# Patient Record
Sex: FEMALE | Race: WHITE | NOT HISPANIC OR LATINO | URBAN - METROPOLITAN AREA
[De-identification: names, ages, dates, MRNs, and addresses within clinical notes are randomized per-mention and may not be internally consistent; named-entity substitution may affect disease eponyms.]

---

## 2017-05-20 ENCOUNTER — OUTPATIENT (OUTPATIENT)
Dept: OUTPATIENT SERVICES | Facility: HOSPITAL | Age: 82
LOS: 1 days | Discharge: HOME | End: 2017-05-20
Payer: MEDICARE

## 2017-05-20 DIAGNOSIS — L03.119 CELLULITIS OF UNSPECIFIED PART OF LIMB: ICD-10-CM

## 2017-05-20 DIAGNOSIS — D75.89 OTHER SPECIFIED DISEASES OF BLOOD AND BLOOD-FORMING ORGANS: ICD-10-CM

## 2017-05-20 DIAGNOSIS — M06.9 RHEUMATOID ARTHRITIS, UNSPECIFIED: ICD-10-CM

## 2017-05-20 DIAGNOSIS — L97.509 NON-PRESSURE CHRONIC ULCER OF OTHER PART OF UNSPECIFIED FOOT WITH UNSPECIFIED SEVERITY: ICD-10-CM

## 2017-05-20 DIAGNOSIS — M10.00 IDIOPATHIC GOUT, UNSPECIFIED SITE: ICD-10-CM

## 2017-05-20 PROCEDURE — 93971 EXTREMITY STUDY: CPT | Mod: 26,RT

## 2017-05-25 DIAGNOSIS — Z00.00 ENCOUNTER FOR GENERAL ADULT MEDICAL EXAMINATION WITHOUT ABNORMAL FINDINGS: ICD-10-CM

## 2017-06-11 ENCOUNTER — RESULT CHARGE (OUTPATIENT)
Age: 82
End: 2017-06-11

## 2017-06-12 ENCOUNTER — CLINICAL ADVICE (OUTPATIENT)
Age: 82
End: 2017-06-12

## 2017-06-12 ENCOUNTER — APPOINTMENT (OUTPATIENT)
Dept: OBGYN | Facility: CLINIC | Age: 82
End: 2017-06-12

## 2017-06-12 VITALS
HEIGHT: 65 IN | SYSTOLIC BLOOD PRESSURE: 118 MMHG | BODY MASS INDEX: 23.32 KG/M2 | WEIGHT: 140 LBS | HEART RATE: 74 BPM | TEMPERATURE: 97.8 F | RESPIRATION RATE: 16 BRPM | OXYGEN SATURATION: 98 % | DIASTOLIC BLOOD PRESSURE: 58 MMHG

## 2017-06-12 DIAGNOSIS — N95.2 POSTMENOPAUSAL ATROPHIC VAGINITIS: ICD-10-CM

## 2017-06-12 DIAGNOSIS — R82.90 UNSPECIFIED ABNORMAL FINDINGS IN URINE: ICD-10-CM

## 2017-06-12 LAB
BILIRUB UR QL STRIP: NORMAL
GLUCOSE UR-MCNC: NORMAL
HCG UR QL: 0.2 EU/DL
HGB UR QL STRIP.AUTO: NORMAL
KETONES UR-MCNC: NORMAL
LEUKOCYTE ESTERASE UR QL STRIP: NORMAL
NITRITE UR QL STRIP: POSITIVE
PH UR STRIP: 6.5
PROT UR STRIP-MCNC: 30
SP GR UR STRIP: 1.02

## 2017-08-24 ENCOUNTER — EMERGENCY (EMERGENCY)
Facility: HOSPITAL | Age: 82
LOS: 0 days | Discharge: HOME | End: 2017-08-24
Attending: EMERGENCY MEDICINE | Admitting: EMERGENCY MEDICINE
Payer: MEDICARE

## 2017-08-24 VITALS — WEIGHT: 139.99 LBS | HEIGHT: 65 IN

## 2017-08-24 VITALS
OXYGEN SATURATION: 100 % | DIASTOLIC BLOOD PRESSURE: 54 MMHG | SYSTOLIC BLOOD PRESSURE: 134 MMHG | RESPIRATION RATE: 15 BRPM | TEMPERATURE: 99 F | HEART RATE: 81 BPM

## 2017-08-24 DIAGNOSIS — M79.89 OTHER SPECIFIED SOFT TISSUE DISORDERS: ICD-10-CM

## 2017-08-24 DIAGNOSIS — I25.10 ATHEROSCLEROTIC HEART DISEASE OF NATIVE CORONARY ARTERY WITHOUT ANGINA PECTORIS: ICD-10-CM

## 2017-08-24 DIAGNOSIS — L03.116 CELLULITIS OF LEFT LOWER LIMB: ICD-10-CM

## 2017-08-24 DIAGNOSIS — I10 ESSENTIAL (PRIMARY) HYPERTENSION: ICD-10-CM

## 2017-08-24 DIAGNOSIS — L03.115 CELLULITIS OF RIGHT LOWER LIMB: ICD-10-CM

## 2017-08-24 DIAGNOSIS — E78.5 HYPERLIPIDEMIA, UNSPECIFIED: ICD-10-CM

## 2017-08-24 LAB
ALBUMIN SERPL ELPH-MCNC: 3.1 G/DL — LOW (ref 3.3–5)
ALP SERPL-CCNC: 87 U/L — SIGNIFICANT CHANGE UP (ref 40–120)
ALT FLD-CCNC: 58 U/L — SIGNIFICANT CHANGE UP (ref 12–78)
ANION GAP SERPL CALC-SCNC: 8 MMOL/L — SIGNIFICANT CHANGE UP (ref 5–17)
AST SERPL-CCNC: 54 U/L — HIGH (ref 15–37)
BASOPHILS # BLD AUTO: 0.1 K/UL — SIGNIFICANT CHANGE UP (ref 0–0.2)
BASOPHILS NFR BLD AUTO: 0.7 % — SIGNIFICANT CHANGE UP (ref 0–2)
BILIRUB SERPL-MCNC: 0.7 MG/DL — SIGNIFICANT CHANGE UP (ref 0.2–1.2)
BUN SERPL-MCNC: 27 MG/DL — HIGH (ref 7–23)
CALCIUM SERPL-MCNC: 9.4 MG/DL — SIGNIFICANT CHANGE UP (ref 8.5–10.1)
CHLORIDE SERPL-SCNC: 103 MMOL/L — SIGNIFICANT CHANGE UP (ref 96–108)
CO2 SERPL-SCNC: 27 MMOL/L — SIGNIFICANT CHANGE UP (ref 22–31)
CREAT SERPL-MCNC: 0.58 MG/DL — SIGNIFICANT CHANGE UP (ref 0.5–1.3)
EOSINOPHIL # BLD AUTO: 0 K/UL — SIGNIFICANT CHANGE UP (ref 0–0.5)
EOSINOPHIL NFR BLD AUTO: 0.1 % — SIGNIFICANT CHANGE UP (ref 0–6)
GLUCOSE SERPL-MCNC: 111 MG/DL — HIGH (ref 70–99)
HCT VFR BLD CALC: 35.1 % — SIGNIFICANT CHANGE UP (ref 34.5–45)
HGB BLD-MCNC: 11.6 G/DL — SIGNIFICANT CHANGE UP (ref 11.5–15.5)
LACTATE SERPL-SCNC: 1.9 MMOL/L — SIGNIFICANT CHANGE UP (ref 0.7–2)
LYMPHOCYTES # BLD AUTO: 0.5 K/UL — LOW (ref 1–3.3)
LYMPHOCYTES # BLD AUTO: 4.6 % — LOW (ref 13–44)
MCHC RBC-ENTMCNC: 31.2 PG — SIGNIFICANT CHANGE UP (ref 27–34)
MCHC RBC-ENTMCNC: 33.1 GM/DL — SIGNIFICANT CHANGE UP (ref 32–36)
MCV RBC AUTO: 94.2 FL — SIGNIFICANT CHANGE UP (ref 80–100)
MONOCYTES # BLD AUTO: 0.7 K/UL — SIGNIFICANT CHANGE UP (ref 0–0.9)
MONOCYTES NFR BLD AUTO: 6 % — SIGNIFICANT CHANGE UP (ref 2–14)
NEUTROPHILS # BLD AUTO: 10.1 K/UL — HIGH (ref 1.8–7.4)
NEUTROPHILS NFR BLD AUTO: 88.5 % — HIGH (ref 43–77)
PLATELET # BLD AUTO: 173 K/UL — SIGNIFICANT CHANGE UP (ref 150–400)
POTASSIUM SERPL-MCNC: 3.7 MMOL/L — SIGNIFICANT CHANGE UP (ref 3.5–5.3)
POTASSIUM SERPL-SCNC: 3.7 MMOL/L — SIGNIFICANT CHANGE UP (ref 3.5–5.3)
PROT SERPL-MCNC: 6.9 GM/DL — SIGNIFICANT CHANGE UP (ref 6–8.3)
RBC # BLD: 3.73 M/UL — LOW (ref 3.8–5.2)
RBC # FLD: 13.3 % — SIGNIFICANT CHANGE UP (ref 10.3–14.5)
SODIUM SERPL-SCNC: 138 MMOL/L — SIGNIFICANT CHANGE UP (ref 135–145)
WBC # BLD: 11.4 K/UL — HIGH (ref 3.8–10.5)
WBC # FLD AUTO: 11.4 K/UL — HIGH (ref 3.8–10.5)

## 2017-08-24 PROCEDURE — 99285 EMERGENCY DEPT VISIT HI MDM: CPT

## 2017-08-24 PROCEDURE — 93010 ELECTROCARDIOGRAM REPORT: CPT

## 2017-08-24 RX ORDER — VANCOMYCIN HCL 1 G
1000 VIAL (EA) INTRAVENOUS ONCE
Qty: 0 | Refills: 0 | Status: COMPLETED | OUTPATIENT
Start: 2017-08-24 | End: 2017-08-24

## 2017-08-24 RX ADMIN — Medication 250 MILLIGRAM(S): at 17:01

## 2017-08-24 NOTE — ED ADULT NURSE NOTE - CHIEF COMPLAINT QUOTE
Patient was sent by Dr. Roberts who she was referred to by Dr. Rojo for evaluation of edematous painful right lower extremity with warmth and erythema.

## 2017-08-24 NOTE — ED STATDOCS - CONSTITUTIONAL, MLM
normal... well appearing, well nourished, and in no apparent distress. well appearing, thin and frail elderly female in no apparent distress.

## 2017-08-24 NOTE — ED STATDOCS - PSH
Cochlear implant in place  left ear  Glaucoma  s/p trabeculectomy  S/P carpal tunnel release    S/P cataract surgery, left    S/P D&C (status post dilation and curettage)

## 2017-08-24 NOTE — ED STATDOCS - SKIN, MLM
skin normal color for race, warm, dry and RLE with erythema and edema. skin normal color for race, warm, dry and RLE with erythema and edema to the mid lower leg extending through the knee and streaking up the posterior leg.

## 2017-08-24 NOTE — ED STATDOCS - ATTENDING CONTRIBUTION TO CARE
I, Ena Aranda MD, personally saw the patient with the resident, and completed the key components of the history and physical exam. I then discussed the management plan with the resident.

## 2017-08-24 NOTE — ED STATDOCS - PMH
Aortic insufficiency    CAD (coronary artery disease)    Cholelithiases    H/O hypercholesterolemia    HTN (hypertension)    Malignant melanoma    Uterine prolapse

## 2017-08-24 NOTE — ED STATDOCS - OBJECTIVE STATEMENT
89 y/o female w/ hx of melanoma, PVD, HTN, HLD presents to the ED for leg edema with onset in the past few days worse in the past few days. Patient sent in from Dr. Roberts's office for evaluation. Patient had onset of sxs about 4 days ago used elevation of leg with ice that minimally improved sxs. Redness is worsening since 4 days and Health aide states ice relieves erythema. Since 8 AM this morning edema has been worsening today as per aide. Patient had melanoma surgery in March 2017. Had a RLE ulcer to the bottom of the foot. No chest pain, shortness of breath, abd pain, N/V/D. 89 y/o female w/ hx of melanoma, PVD, HTN, HLD presents to the ED for leg edema with onset in the past few days worse in the past few hours. Patient sent in from Dr. Roberts's office for evaluation. Patient had onset of sxs about 4 days ago used elevation of leg with ice that minimally improved sxs. Redness is worsening since 4 days and Health aide states ice relieves erythema. Since 8 AM this morning edema has been worsening as per aide. Patient had melanoma surgery in March 2017 to the posterior medial RLE. Had a RLE ulcer to the bottom of the foot as well under treatment with Podiatry. No chest pain, shortness of breath, abd pain, N/V/D.

## 2017-08-24 NOTE — ED STATDOCS - PROGRESS NOTE DETAILS
Labwork resulted, lactate normal,  wbc mildly elevated, blood cultures pending, will send pt home with oral abx, as allergic to bactrim, will send on clindamycin. patient also has 24 hour aid at home. Lab work resulted, lactate normal,  wbc mildly elevated, blood cultures pending, will send pt home with oral abx, as allergic to bactrim, will send on clindamycin. patient also has 24 hour aid at home.

## 2017-08-25 LAB
-  CANDIDA ALBICANS: SIGNIFICANT CHANGE UP
-  CANDIDA GLABRATA: SIGNIFICANT CHANGE UP
-  CANDIDA KRUSEI: SIGNIFICANT CHANGE UP
-  CANDIDA PARAPSILOSIS: SIGNIFICANT CHANGE UP
-  CANDIDA TROPICALIS: SIGNIFICANT CHANGE UP
-  COAGULASE NEGATIVE STAPHYLOCOCCUS: SIGNIFICANT CHANGE UP
-  K. PNEUMONIAE GROUP: SIGNIFICANT CHANGE UP
-  KPC RESISTANCE GENE: SIGNIFICANT CHANGE UP
-  STREPTOCOCCUS SP. (NOT GRP A, B OR S PNEUMONIAE): SIGNIFICANT CHANGE UP
A BAUMANNII DNA SPEC QL NAA+PROBE: SIGNIFICANT CHANGE UP
E CLOAC COMP DNA BLD POS QL NAA+PROBE: SIGNIFICANT CHANGE UP
E COLI DNA BLD POS QL NAA+NON-PROBE: SIGNIFICANT CHANGE UP
ENTEROCOC DNA BLD POS QL NAA+NON-PROBE: SIGNIFICANT CHANGE UP
ENTEROCOC DNA BLD POS QL NAA+NON-PROBE: SIGNIFICANT CHANGE UP
GP B STREP DNA BLD POS QL NAA+NON-PROBE: SIGNIFICANT CHANGE UP
GRAM STN FLD: SIGNIFICANT CHANGE UP
HAEM INFLU DNA BLD POS QL NAA+NON-PROBE: SIGNIFICANT CHANGE UP
K OXYTOCA DNA BLD POS QL NAA+NON-PROBE: SIGNIFICANT CHANGE UP
L MONOCYTOG DNA BLD POS QL NAA+NON-PROBE: SIGNIFICANT CHANGE UP
METHOD TYPE: SIGNIFICANT CHANGE UP
MRSA SPEC QL CULT: SIGNIFICANT CHANGE UP
MSSA DNA SPEC QL NAA+PROBE: SIGNIFICANT CHANGE UP
N MEN ISLT CULT: SIGNIFICANT CHANGE UP
P AERUGINOSA DNA BLD POS NAA+NON-PROBE: SIGNIFICANT CHANGE UP
PROTEUS SP DNA BLD POS QL NAA+NON-PROBE: SIGNIFICANT CHANGE UP
S MARCESCENS DNA BLD POS NAA+NON-PROBE: SIGNIFICANT CHANGE UP
S PNEUM DNA BLD POS QL NAA+NON-PROBE: SIGNIFICANT CHANGE UP
S PYO DNA BLD POS QL NAA+NON-PROBE: SIGNIFICANT CHANGE UP
SPECIMEN SOURCE: SIGNIFICANT CHANGE UP
SPECIMEN SOURCE: SIGNIFICANT CHANGE UP

## 2017-08-25 NOTE — ED POST DISCHARGE NOTE - OTHER COMMUNICATION
Contacted patient by phone and reported results of + blood culture. Instructed to return to ED for further evaluation and treatment. Patient agreed to return to ED. Car SOSA.

## 2017-08-27 LAB
-  CEFAZOLIN: SIGNIFICANT CHANGE UP
-  CLINDAMYCIN: SIGNIFICANT CHANGE UP
-  LEVOFLOXACIN: SIGNIFICANT CHANGE UP
-  PENICILLIN: SIGNIFICANT CHANGE UP
-  VANCOMYCIN: SIGNIFICANT CHANGE UP
CULTURE RESULTS: SIGNIFICANT CHANGE UP
CULTURE RESULTS: SIGNIFICANT CHANGE UP
METHOD TYPE: SIGNIFICANT CHANGE UP
ORGANISM # SPEC MICROSCOPIC CNT: SIGNIFICANT CHANGE UP
SPECIMEN SOURCE: SIGNIFICANT CHANGE UP
SPECIMEN SOURCE: SIGNIFICANT CHANGE UP

## 2017-08-28 ENCOUNTER — INPATIENT (INPATIENT)
Facility: HOSPITAL | Age: 82
LOS: 3 days | Discharge: NURSING, REHAB OR LONG-TERM CARE | DRG: 872 | End: 2017-09-01
Attending: HOSPITALIST | Admitting: HOSPITALIST
Payer: MEDICARE

## 2017-08-28 VITALS
DIASTOLIC BLOOD PRESSURE: 64 MMHG | SYSTOLIC BLOOD PRESSURE: 121 MMHG | TEMPERATURE: 98 F | HEIGHT: 65 IN | HEART RATE: 75 BPM | RESPIRATION RATE: 20 BRPM | OXYGEN SATURATION: 99 % | WEIGHT: 143.08 LBS

## 2017-08-28 DIAGNOSIS — R79.89 OTHER SPECIFIED ABNORMAL FINDINGS OF BLOOD CHEMISTRY: ICD-10-CM

## 2017-08-28 DIAGNOSIS — M25.552 PAIN IN LEFT HIP: ICD-10-CM

## 2017-08-28 DIAGNOSIS — L27.0 GENERALIZED SKIN ERUPTION DUE TO DRUGS AND MEDICAMENTS TAKEN INTERNALLY: ICD-10-CM

## 2017-08-28 DIAGNOSIS — R82.71 BACTERIURIA: ICD-10-CM

## 2017-08-28 LAB
ALBUMIN SERPL ELPH-MCNC: 3 G/DL — LOW (ref 3.3–5.2)
ALP SERPL-CCNC: 99 U/L — SIGNIFICANT CHANGE UP (ref 40–120)
ALT FLD-CCNC: 39 U/L — HIGH
ANION GAP SERPL CALC-SCNC: 13 MMOL/L — SIGNIFICANT CHANGE UP (ref 5–17)
AST SERPL-CCNC: 37 U/L — HIGH
BASOPHILS # BLD AUTO: 0 K/UL — SIGNIFICANT CHANGE UP (ref 0–0.2)
BASOPHILS NFR BLD AUTO: 0.3 % — SIGNIFICANT CHANGE UP (ref 0–2)
BILIRUB SERPL-MCNC: 0.5 MG/DL — SIGNIFICANT CHANGE UP (ref 0.4–2)
BUN SERPL-MCNC: 13 MG/DL — SIGNIFICANT CHANGE UP (ref 8–20)
CALCIUM SERPL-MCNC: 8.5 MG/DL — LOW (ref 8.6–10.2)
CHLORIDE SERPL-SCNC: 100 MMOL/L — SIGNIFICANT CHANGE UP (ref 98–107)
CO2 SERPL-SCNC: 26 MMOL/L — SIGNIFICANT CHANGE UP (ref 22–29)
CREAT SERPL-MCNC: 0.38 MG/DL — LOW (ref 0.5–1.3)
EOSINOPHIL # BLD AUTO: 0 K/UL — SIGNIFICANT CHANGE UP (ref 0–0.5)
EOSINOPHIL NFR BLD AUTO: 0.5 % — SIGNIFICANT CHANGE UP (ref 0–6)
GLUCOSE SERPL-MCNC: 105 MG/DL — SIGNIFICANT CHANGE UP (ref 70–115)
HCT VFR BLD CALC: 35.3 % — LOW (ref 37–47)
HGB BLD-MCNC: 11.7 G/DL — LOW (ref 12–16)
LACTATE BLDV-MCNC: 1.1 MMOL/L — SIGNIFICANT CHANGE UP (ref 0.5–2)
LYMPHOCYTES # BLD AUTO: 1 K/UL — SIGNIFICANT CHANGE UP (ref 1–4.8)
LYMPHOCYTES # BLD AUTO: 12.5 % — LOW (ref 20–55)
MCHC RBC-ENTMCNC: 30.6 PG — SIGNIFICANT CHANGE UP (ref 27–31)
MCHC RBC-ENTMCNC: 33.1 G/DL — SIGNIFICANT CHANGE UP (ref 32–36)
MCV RBC AUTO: 92.4 FL — SIGNIFICANT CHANGE UP (ref 81–99)
MONOCYTES # BLD AUTO: 0.7 K/UL — SIGNIFICANT CHANGE UP (ref 0–0.8)
MONOCYTES NFR BLD AUTO: 9.3 % — SIGNIFICANT CHANGE UP (ref 3–10)
NEUTROPHILS # BLD AUTO: 6 K/UL — SIGNIFICANT CHANGE UP (ref 1.8–8)
NEUTROPHILS NFR BLD AUTO: 76.5 % — HIGH (ref 37–73)
PLATELET # BLD AUTO: 253 K/UL — SIGNIFICANT CHANGE UP (ref 150–400)
POTASSIUM SERPL-MCNC: 4.2 MMOL/L — SIGNIFICANT CHANGE UP (ref 3.5–5.3)
POTASSIUM SERPL-SCNC: 4.2 MMOL/L — SIGNIFICANT CHANGE UP (ref 3.5–5.3)
PROT SERPL-MCNC: 6.4 G/DL — LOW (ref 6.6–8.7)
RBC # BLD: 3.82 M/UL — LOW (ref 4.4–5.2)
RBC # FLD: 14.6 % — SIGNIFICANT CHANGE UP (ref 11–15.6)
SODIUM SERPL-SCNC: 139 MMOL/L — SIGNIFICANT CHANGE UP (ref 135–145)
WBC # BLD: 7.9 K/UL — SIGNIFICANT CHANGE UP (ref 4.8–10.8)
WBC # FLD AUTO: 7.9 K/UL — SIGNIFICANT CHANGE UP (ref 4.8–10.8)

## 2017-08-28 PROCEDURE — 99223 1ST HOSP IP/OBS HIGH 75: CPT

## 2017-08-28 PROCEDURE — 99285 EMERGENCY DEPT VISIT HI MDM: CPT

## 2017-08-28 PROCEDURE — 76377 3D RENDER W/INTRP POSTPROCES: CPT | Mod: 26

## 2017-08-28 PROCEDURE — 72192 CT PELVIS W/O DYE: CPT | Mod: 26

## 2017-08-28 PROCEDURE — 73502 X-RAY EXAM HIP UNI 2-3 VIEWS: CPT | Mod: 26,LT

## 2017-08-28 RX ORDER — VANCOMYCIN HCL 1 G
1000 VIAL (EA) INTRAVENOUS ONCE
Qty: 0 | Refills: 0 | Status: COMPLETED | OUTPATIENT
Start: 2017-08-28 | End: 2017-08-28

## 2017-08-28 RX ORDER — VANCOMYCIN HCL 1 G
1000 VIAL (EA) INTRAVENOUS EVERY 24 HOURS
Qty: 0 | Refills: 0 | Status: DISCONTINUED | OUTPATIENT
Start: 2017-08-29 | End: 2017-08-29

## 2017-08-28 RX ORDER — PIPERACILLIN AND TAZOBACTAM 4; .5 G/20ML; G/20ML
3.38 INJECTION, POWDER, LYOPHILIZED, FOR SOLUTION INTRAVENOUS ONCE
Qty: 0 | Refills: 0 | Status: COMPLETED | OUTPATIENT
Start: 2017-08-28 | End: 2017-08-28

## 2017-08-28 RX ORDER — ENOXAPARIN SODIUM 100 MG/ML
40 INJECTION SUBCUTANEOUS DAILY
Qty: 0 | Refills: 0 | Status: DISCONTINUED | OUTPATIENT
Start: 2017-08-28 | End: 2017-09-01

## 2017-08-28 RX ORDER — ACETAMINOPHEN WITH CODEINE 300MG-30MG
1 TABLET ORAL ONCE
Qty: 0 | Refills: 0 | Status: DISCONTINUED | OUTPATIENT
Start: 2017-08-28 | End: 2017-08-28

## 2017-08-28 RX ORDER — ATORVASTATIN CALCIUM 80 MG/1
10 TABLET, FILM COATED ORAL AT BEDTIME
Qty: 0 | Refills: 0 | Status: DISCONTINUED | OUTPATIENT
Start: 2017-08-28 | End: 2017-09-01

## 2017-08-28 RX ORDER — DOCUSATE SODIUM 100 MG
100 CAPSULE ORAL
Qty: 0 | Refills: 0 | Status: DISCONTINUED | OUTPATIENT
Start: 2017-08-28 | End: 2017-09-01

## 2017-08-28 RX ORDER — OMEGA-3 ACID ETHYL ESTERS 1 G
2 CAPSULE ORAL
Qty: 0 | Refills: 0 | Status: DISCONTINUED | OUTPATIENT
Start: 2017-08-28 | End: 2017-09-01

## 2017-08-28 RX ORDER — OXYCODONE AND ACETAMINOPHEN 5; 325 MG/1; MG/1
1 TABLET ORAL EVERY 4 HOURS
Qty: 0 | Refills: 0 | Status: DISCONTINUED | OUTPATIENT
Start: 2017-08-28 | End: 2017-09-01

## 2017-08-28 RX ORDER — SENNA PLUS 8.6 MG/1
2 TABLET ORAL AT BEDTIME
Qty: 0 | Refills: 0 | Status: DISCONTINUED | OUTPATIENT
Start: 2017-08-28 | End: 2017-09-01

## 2017-08-28 RX ORDER — ASPIRIN/CALCIUM CARB/MAGNESIUM 324 MG
81 TABLET ORAL DAILY
Qty: 0 | Refills: 0 | Status: DISCONTINUED | OUTPATIENT
Start: 2017-08-28 | End: 2017-09-01

## 2017-08-28 RX ORDER — CHOLECALCIFEROL (VITAMIN D3) 125 MCG
1000 CAPSULE ORAL DAILY
Qty: 0 | Refills: 0 | Status: DISCONTINUED | OUTPATIENT
Start: 2017-08-28 | End: 2017-09-01

## 2017-08-28 RX ORDER — AMLODIPINE BESYLATE 2.5 MG/1
5 TABLET ORAL DAILY
Qty: 0 | Refills: 0 | Status: DISCONTINUED | OUTPATIENT
Start: 2017-08-28 | End: 2017-09-01

## 2017-08-28 RX ORDER — PYRIDOXINE HCL (VITAMIN B6) 100 MG
50 TABLET ORAL DAILY
Qty: 0 | Refills: 0 | Status: DISCONTINUED | OUTPATIENT
Start: 2017-08-28 | End: 2017-09-01

## 2017-08-28 RX ORDER — POLYETHYLENE GLYCOL 3350 17 G/17G
17 POWDER, FOR SOLUTION ORAL DAILY
Qty: 0 | Refills: 0 | Status: DISCONTINUED | OUTPATIENT
Start: 2017-08-28 | End: 2017-09-01

## 2017-08-28 RX ADMIN — PIPERACILLIN AND TAZOBACTAM 200 GRAM(S): 4; .5 INJECTION, POWDER, LYOPHILIZED, FOR SOLUTION INTRAVENOUS at 17:45

## 2017-08-28 RX ADMIN — Medication 1 TABLET(S): at 15:13

## 2017-08-28 RX ADMIN — OXYCODONE AND ACETAMINOPHEN 1 TABLET(S): 5; 325 TABLET ORAL at 23:00

## 2017-08-28 RX ADMIN — OXYCODONE AND ACETAMINOPHEN 1 TABLET(S): 5; 325 TABLET ORAL at 22:13

## 2017-08-28 RX ADMIN — Medication 250 MILLIGRAM(S): at 22:13

## 2017-08-28 RX ADMIN — Medication 1 TABLET(S): at 15:43

## 2017-08-28 NOTE — H&P ADULT - ASSESSMENT
Pt with multiple medical problems as mentioned above, was seen in [facility] 4 days ago and treated for cellulitis, discharged home on clindamycin and today was called back for positive blood culture showing gram positive cocci in pairs and chains. Pt has no complaints at this time. Pt with above mentioned medical problems as mentioned above, was seen in [facility] 4 days ago and treated for cellulitis, discharged home on clindamycin and today was called back for positive blood culture showing gram positive cocci in pairs and chains. Pt has no complaints at this time except constipation.   S/p Vanco and zosyn in ED, evaluated by ID and recommended keep patient on vancomycin and d/c clindamycin.    Positive blood culture:  - Group B strep, being treated for RLE cellulitis with clindamycin from [facility].  - No fever or leukocytosis  - Seen by ID, will start on vancomycin. Pt got one dose now, awaiting BMP result, will re-dose tomorrow based on renal function.  - Blood cultures ordered.  - F/u ID in am.    History of melanoma, PVD, HTN, HLD:  - needs more collateral info from PMD, I called his office but unreachable.  - Resume home medication as reconsilled with antihypertensive at lower dose for now. Pt with above mentioned medical problems as mentioned above, was seen in [facility] 4 days ago and treated for cellulitis, discharged home on clindamycin and today was called back for positive blood culture showing gram positive cocci in pairs and chains. Pt has no complaints at this time except constipation.   S/p Vanco and zosyn in ED, evaluated by ID and recommended keep patient on vancomycin and d/c clindamycin.    Bacteremia: Positive blood culture.  - Group B strep, being treated for RLE cellulitis with clindamycin from [facility].  - No fever or leukocytosis  - Seen by ID, will start on vancomycin. Stat dose ordered by ED physician, awaiting BMP result, will re-dose tomorrow based on renal function.  - Blood cultures ordered.  - F/u ID in am.    History of melanoma, PVD, HTN, HLD:  - needs more collateral info from PMD, I called his office but unreachable.  - Resume home medication as reconciled with antihypertensive at lower dose for now.

## 2017-08-28 NOTE — ED ADULT NURSE REASSESSMENT NOTE - NS ED NURSE REASSESS COMMENT FT1
Pt back from echo with zosyn not infusing.  IV catheter in left AC checked, repositioned and patent.  Zosyn continued.  Pt c/o left hip pain.  will medicate as per MD order.  Awaiting transport to [address] [place] [address].
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
## 2017-08-28 NOTE — H&P ADULT - HISTORY OF PRESENT ILLNESS
89 y/o female w/ hx of melanoma, PVD, HTN, HLD, seen in ED with HHA at bedside. History is very limited, obtained from patient HHA and medical records. She was recently seen in [facility] on 8/24 and found to have RLE cellulitis, blood cx were drawn and patient was discharged from ED on oral clindamycin. Pt was called back from [facility] for positive blood culture and she was told to come back to ED. Per patient and HHA her RLE cellulitis is much better with clindamycin. She reported constipation otherwise denied any active complaints, no fever, chills, nausea, vomiting, headache, dizziness, chest pain, SOB or palpitation.  In ED patient got vancomycin and zosyn and blood work ordered including repeat blood culture. ID was also consulted.

## 2017-08-28 NOTE — ED ADULT NURSE NOTE - CHPI ED SYMPTOMS NEG
no deformity/no tingling/no abrasion/no numbness/no back pain/no difficulty bearing weight/no bruising/no fever/no stiffness/no weakness

## 2017-08-28 NOTE — ED ADULT TRIAGE NOTE - CHIEF COMPLAINT QUOTE
Patient BIBA to ED today with c/o left hip pain.  Patient denies recent injury.  Patient is with home health aide.

## 2017-08-28 NOTE — ED PROVIDER NOTE - CARE PLAN
Principal Discharge DX:	Blood culture positive for microorganism  Secondary Diagnosis:	Pain of left hip joint

## 2017-08-28 NOTE — ED PROVIDER NOTE - SKIN, MLM
Skin normal color for race, warm, dry and intact. Hyperpigmentation noted to bilateral lower extremity right greater than left

## 2017-08-28 NOTE — CONSULT NOTE ADULT - PROBLEM SELECTOR RECOMMENDATION 3
Reports rash to cephalosporin   Tolerated a dose of Zosyn in the ED.   Will discuss options with pharmacist tomorrow

## 2017-08-28 NOTE — H&P ADULT - NSHPPHYSICALEXAM_GEN_ALL_CORE
PHYSICAL EXAM:    Vital Signs Last 24 Hrs  T(C): 36.9 (28 Aug 2017 12:46), Max: 36.9 (28 Aug 2017 12:46)  T(F): 98.5 (28 Aug 2017 12:46), Max: 98.5 (28 Aug 2017 12:46)  HR: 75 (28 Aug 2017 12:46) (75 - 75)  BP: 121/64 (28 Aug 2017 12:46) (121/64 - 121/64)  BP(mean): --  RR: 20 (28 Aug 2017 12:46) (20 - 20)  SpO2: 99% (28 Aug 2017 12:46) (99% - 99%)    GENERAL: Pt lying comfortably, NAD.  ENMT: PERRL, +EOMI.  NECK: soft, Supple, No JVD,   CHEST/LUNG: Clear to auscultation bilaterally; No wheezing.  HEART: S1S2+, Regular rate and rhythm; No murmurs.  ABDOMEN: Soft, Nontender, Nondistended; Bowel sounds present.  MUSCULOSKELETAL: Range of motion limited.  SKIN: B/l LE hyperpigmentation.  NEURO: AAOX3, no focal deficits, no motor r sensory loss.  PSYCH: normal mood. PHYSICAL EXAM:    Vital Signs Last 24 Hrs  T(C): 36.9 (28 Aug 2017 12:46), Max: 36.9 (28 Aug 2017 12:46)  T(F): 98.5 (28 Aug 2017 12:46), Max: 98.5 (28 Aug 2017 12:46)  HR: 75 (28 Aug 2017 12:46) (75 - 75)  BP: 121/64 (28 Aug 2017 12:46) (121/64 - 121/64)  BP(mean): --  RR: 20 (28 Aug 2017 12:46) (20 - 20)  SpO2: 99% (28 Aug 2017 12:46) (99% - 99%)    GENERAL: Pt lying comfortably, NAD, hard of hearing.  ENMT: PERRL, +EOMI.  NECK: soft, Supple, No JVD,   CHEST/LUNG: Clear to auscultation bilaterally; No wheezing.  HEART: S1S2+, Regular rate and rhythm; No murmurs.  ABDOMEN: Soft, Nontender, Nondistended; Bowel sounds present.  MUSCULOSKELETAL: Range of motion limited.  SKIN: RLE redness, per HHA much better now. Very fragile skin with bruises.  NEURO: AAOX3, no focal deficits, no motor r sensory loss.  PSYCH: normal mood.

## 2017-08-28 NOTE — CONSULT NOTE ADULT - SUBJECTIVE AND OBJECTIVE BOX
[facility]  INFECTIOUS DISEASES AND INTERNAL MEDICINE OF [place]  =======================================================  Krunal Delacruz MD  Diplomates American Board of Internal Medicine and Infectious Diseases  =======================================================      [id number]  MAYRA BROWN    CC: Patient is a 89y old  Female who presents with a chief complaint of Called for positive blood culture. (28 Aug 2017 16:49)    89y/o  Female with h/o melanoma, PVD, HTN, HLD. Patient was seen by her vascular surgeon last week Tuesday 8/22 and was having an Unna boot placed. Patient laser that days felt chills which continued on through Thursday at which time her LLE swelled up. She called the surgeon and was brought to be seen in the office and was referred to [facility] from the office on 8/24 for IV antibiotics for a cellulitis. Patient was discharged from tat hospital visit on Clindamycin 300mg PO q6 hours. Patient improved. She was called at home today to come into the ED due to positive blood cultures. Patient reports a cephalosporin allergy but was given Vancomycin and Zosyn in the ER. ID input requested.       Past Medical & Surgical Hx:  Cholelithiases  Malignant melanoma  Aortic insufficiency  CAD (coronary artery disease)  Uterine prolapse  H/O hypercholesterolemia  HTN (hypertension)  Glaucoma: s/p trabeculectomy  Cochlear implant in place: left ear  S/P carpal tunnel release  S/P D&C (status post dilation and curettage)  S/P cataract surgery, left      Social Hx:  Denies smoking, ETPH or drug use      FAMILY HISTORY:  No pertinent family history in first degree relatives      Allergies  Bactrim (Unknown)  Neosporin (Rash)      Antibiotics:  Vancomycin IV       REVIEW OF SYSTEMS:  CONSTITUTIONAL:  No Fever or chills  HEENT:  No diplopia or blurred vision.  No earache, sore throat or runny nose.  CARDIOVASCULAR:  No pressure, squeezing, strangling, tightness, heaviness or aching about the chest, neck, axilla or epigastrium.  RESPIRATORY:  No cough, shortness of breath  GASTROINTESTINAL:  No nausea, vomiting or diarrhea.  GENITOURINARY:  No dysuria, frequency or urgency  MUSCULOSKELETAL:  no joint aches, no muscle pain  SKIN:  No change in skin, hair or nails.  NEUROLOGIC:  No paresthesias, fasciculations  PSYCHIATRIC:  No disorder of thought or mood.  ENDOCRINE:  No heat or cold intolerance  HEMATOLOGICAL:  No easy bruising or bleeding.       Physical Exam:  Vital Signs Last 24 Hrs  T(C): 36.9 (28 Aug 2017 12:46), Max: 36.9 (28 Aug 2017 12:46)  T(F): 98.5 (28 Aug 2017 12:46), Max: 98.5 (28 Aug 2017 12:46)  HR: 78 (28 Aug 2017 22:05) (75 - 78)  BP: 121/64 (28 Aug 2017 12:46) (121/64 - 121/64)  RR: 18 (28 Aug 2017 22:05) (18 - 20)  SpO2: 99% (28 Aug 2017 12:46) (99% - 99%)  Height (cm): 165.1 (08-28 @ 12:46)  Weight (kg): 64.9 (08-28 @ 12:46)  BMI (kg/m2): 23.8 (08-28 @ 12:46)  BSA (m2): 1.72 (08-28 @ 12:46)      GEN: NAD, pleasant  HEENT: normocephalic and atraumatic. EOMI. PERRL.  +hearing loss, chronic  NECK: Supple  LUNGS: Clear to auscultation.  HEART: Regular rate and rhythm   ABDOMEN: Soft, nontender, and nondistended.  Positive bowel sounds.    : No CVA tenderness  EXTREMITIES: LLE with healed ulcer, no erythema. B/L [place] toes.  MSK: No joint swelling  NEUROLOGIC: no focal deficits  PSYCHIATRIC: Appropriate affect .  SKIN: No rash        Labs:  08-28    139  |  100  |  13.0  ----------------------------<  105  4.2   |  26.0  |  0.38<L>    Ca    8.5<L>      28 Aug 2017 18:17    TPro  6.4<L>  /  Alb  3.0<L>  /  TBili  0.5  /  DBili  x   /  AST  37<H>  /  ALT  39<H>  /  AlkPhos  99  08-28                          11.7   7.9   )-----------( 253      ( 28 Aug 2017 18:17 )             35.3       LIVER FUNCTIONS - ( 28 Aug 2017 18:17 )  Alb: 3.0 g/dL / Pro: 6.4 g/dL / ALK PHOS: 99 U/L / ALT: 39 U/L / AST: 37 U/L / GGT: x             RECENT CULTURES:  Culture - Blood (08.24.17 @ 16:22)    Gram Stain:   Growth in aerobic and anaerobic bottles: Gram Positive Cocci in Pairs and  Chains    Specimen Source: .Blood None    Culture Results:   Growth in aerobic and anaerobic bottles: Streptococcus agalactiae (Group  B)      Culture - Blood (08.24.17 @ 16:03)    -  Levofloxacin: S [id number]    -  Cefazolin: S    -  Vancomycin: S [id number]    -  Penicillin: S    -  Streptococcus agalactiae (Group B): Detec    Gram Stain:   Growth in aerobic and anaerobic bottles: Gram Positive Cocci in Pairs and  Chains    -  Clindamycin: S    Specimen Source: .Blood None    Organism: Blood Culture PCR    Organism: Streptococcus agalactiae (Group B)    Culture Results:   Growth in aerobic and anaerobic bottles: Streptococcus agalactiae (Group  B)  ***Blood Panel PCR results on this specimen are available  approximately 3 hours after the Gram stain result.***  Gram stain, PCR, and/or culture results may not always  correspond due to difference in methodologies.    Organism Identification: Blood Culture PCR  Streptococcus agalactiae (Group B)    Method Type: KB    Method Type: PCR        EXAM:  CT PELVIS BONY ONLY                        EXAM:  CT 3D RECONSTRUCT W DOMONIQUE                        PROCEDURE DATE:  08/28/2017    INTERPRETATION:  CT PELVIS BONY ONLY, CT 3D RECONSTRUCT W DOMONIQUE dated   8/28/2017 5:05 PM   INDICATION: Left hip pain  COMPARISON: Pelvic radiographs of the same date  TECHNIQUE: CT imaging of the pelvis was performed. The data was   reformatted in the axial, coronal, and sagittal planes. Additionally, 3-D   reformatted imaging was created at a separate workstation.  FINDINGS:   Evaluation of the osseous structures demonstrates mildly decreased bone   mineralization which limits detailed evaluation. Given this limitation,   no displaced fracture is identified. There is spurring and productive   change about both acetabulum. There is moderate spurring at the pubic   symphysis. A moderate levocurvature of the lower lumbar spine with severe   multilevel spondylosis is noted. The sacroiliac joints appear intact.  Evaluation of the soft tissues demonstrates a symmetric appearance of the   muscle bulk. No intramuscular hematoma is identified. The proximal   hamstring tendons demonstrate moderate enthesophyte changes bilaterally.   The iliopsoas tendon is intact bilaterally. There is spurring and   productive change of both abductor tendon insertions.  There is a mild to moderate amount of pelvic ascites. There is   cholelithiasis. There are multiple nonenlarged mesenteric lymph nodes   within the right abdomen. There is a 1.1 cm right periportal lymph node.   There is a 1.8 cm hypodense left inguinal lymph node. There is a mid   pelvic mesenteric mass that measures approximately 2.1 x 2.8 x 2.3 cm.   There is a heterogeneous appearance of the uterus. There are   calcifications which are nonobstructing at the lower visualized portions   of the kidneys bilaterally. There is a small fat-containing umbilical   hernia. There are scattered colonic diverticula.  There is moderate soft tissue swelling about the pelvis. No soft tissue   mass is seen. Moderate amount of vascular calcification within the   infrarenal abdominal aorta and its major branches.  3-D reformatted imaging confirms these findings.  IMPRESSION:   1.  Limited by decreased bone mineralization. No displaced fracture.  2.  Mass in the mid pelvic mesentery suspicious for neoplasm. Mild to   moderate abdominal ascites with lymphadenopathy as described above.   Heterogeneous appearance of the uterus. Dedicated CT the abdomen and   pelvis can be performed for further evaluation.  3.  Degenerative changes as above.  4.  Cholelithiasis.

## 2017-08-28 NOTE — ED ADULT NURSE REASSESSMENT NOTE - NS ED NURSE REASSESS COMMENT FT1
pt's home health aid screaming at staff and using unprofessional language during transport to floor.

## 2017-08-29 DIAGNOSIS — A40.1 SEPSIS DUE TO STREPTOCOCCUS, GROUP B: ICD-10-CM

## 2017-08-29 LAB
ANION GAP SERPL CALC-SCNC: 13 MMOL/L — SIGNIFICANT CHANGE UP (ref 5–17)
BUN SERPL-MCNC: 11 MG/DL — SIGNIFICANT CHANGE UP (ref 8–20)
CALCIUM SERPL-MCNC: 7.9 MG/DL — LOW (ref 8.6–10.2)
CHLORIDE SERPL-SCNC: 99 MMOL/L — SIGNIFICANT CHANGE UP (ref 98–107)
CO2 SERPL-SCNC: 24 MMOL/L — SIGNIFICANT CHANGE UP (ref 22–29)
CREAT SERPL-MCNC: 0.32 MG/DL — LOW (ref 0.5–1.3)
GLUCOSE SERPL-MCNC: 92 MG/DL — SIGNIFICANT CHANGE UP (ref 70–115)
HCT VFR BLD CALC: 32.5 % — LOW (ref 37–47)
HGB BLD-MCNC: 10.3 G/DL — LOW (ref 12–16)
MAGNESIUM SERPL-MCNC: 1.7 MG/DL — SIGNIFICANT CHANGE UP (ref 1.6–2.6)
MCHC RBC-ENTMCNC: 29.8 PG — SIGNIFICANT CHANGE UP (ref 27–31)
MCHC RBC-ENTMCNC: 31.7 G/DL — LOW (ref 32–36)
MCV RBC AUTO: 93.9 FL — SIGNIFICANT CHANGE UP (ref 81–99)
PLATELET # BLD AUTO: 168 K/UL — SIGNIFICANT CHANGE UP (ref 150–400)
POTASSIUM SERPL-MCNC: 4.3 MMOL/L — SIGNIFICANT CHANGE UP (ref 3.5–5.3)
POTASSIUM SERPL-SCNC: 4.3 MMOL/L — SIGNIFICANT CHANGE UP (ref 3.5–5.3)
RBC # BLD: 3.46 M/UL — LOW (ref 4.4–5.2)
RBC # FLD: 14.8 % — SIGNIFICANT CHANGE UP (ref 11–15.6)
SODIUM SERPL-SCNC: 136 MMOL/L — SIGNIFICANT CHANGE UP (ref 135–145)
VANCOMYCIN TROUGH SERPL-MCNC: 9.6 UG/ML — LOW (ref 10–20)
WBC # BLD: 6.9 K/UL — SIGNIFICANT CHANGE UP (ref 4.8–10.8)
WBC # FLD AUTO: 6.9 K/UL — SIGNIFICANT CHANGE UP (ref 4.8–10.8)

## 2017-08-29 PROCEDURE — 99233 SBSQ HOSP IP/OBS HIGH 50: CPT

## 2017-08-29 RX ORDER — OMEGA-3 ACID ETHYL ESTERS 1 G
0 CAPSULE ORAL
Qty: 0 | Refills: 0 | COMMUNITY

## 2017-08-29 RX ORDER — POLYETHYLENE GLYCOL 3350 17 G/17G
0 POWDER, FOR SOLUTION ORAL
Qty: 0 | Refills: 0 | COMMUNITY

## 2017-08-29 RX ORDER — METHENAMINE MANDELATE 1 G
0 TABLET ORAL
Qty: 0 | Refills: 0 | COMMUNITY

## 2017-08-29 RX ORDER — VANCOMYCIN HCL 1 G
750 VIAL (EA) INTRAVENOUS ONCE
Qty: 0 | Refills: 0 | Status: COMPLETED | OUTPATIENT
Start: 2017-08-29 | End: 2017-08-29

## 2017-08-29 RX ORDER — ATORVASTATIN CALCIUM 80 MG/1
0 TABLET, FILM COATED ORAL
Qty: 0 | Refills: 0 | COMMUNITY

## 2017-08-29 RX ORDER — VANCOMYCIN HCL 1 G
750 VIAL (EA) INTRAVENOUS EVERY 12 HOURS
Qty: 0 | Refills: 0 | Status: DISCONTINUED | OUTPATIENT
Start: 2017-08-29 | End: 2017-08-30

## 2017-08-29 RX ORDER — GLUCOSAMINE/MSM/CHONDROITIN A 750-375MG
0 TABLET ORAL
Qty: 0 | Refills: 0 | COMMUNITY

## 2017-08-29 RX ORDER — MAGNESIUM SULFATE 500 MG/ML
2 VIAL (ML) INJECTION ONCE
Qty: 0 | Refills: 0 | Status: COMPLETED | OUTPATIENT
Start: 2017-08-29 | End: 2017-08-29

## 2017-08-29 RX ORDER — VANCOMYCIN HCL 1 G
750 VIAL (EA) INTRAVENOUS
Qty: 0 | Refills: 0 | Status: DISCONTINUED | OUTPATIENT
Start: 2017-08-29 | End: 2017-08-29

## 2017-08-29 RX ADMIN — Medication 1000 UNIT(S): at 13:27

## 2017-08-29 RX ADMIN — SENNA PLUS 2 TABLET(S): 8.6 TABLET ORAL at 21:26

## 2017-08-29 RX ADMIN — Medication 50 MILLIGRAM(S): at 13:27

## 2017-08-29 RX ADMIN — Medication 81 MILLIGRAM(S): at 13:08

## 2017-08-29 RX ADMIN — ENOXAPARIN SODIUM 40 MILLIGRAM(S): 100 INJECTION SUBCUTANEOUS at 13:08

## 2017-08-29 RX ADMIN — Medication 2 GRAM(S): at 06:50

## 2017-08-29 RX ADMIN — ATORVASTATIN CALCIUM 10 MILLIGRAM(S): 80 TABLET, FILM COATED ORAL at 21:26

## 2017-08-29 RX ADMIN — Medication 150 MILLIGRAM(S): at 13:02

## 2017-08-29 RX ADMIN — POLYETHYLENE GLYCOL 3350 17 GRAM(S): 17 POWDER, FOR SOLUTION ORAL at 13:08

## 2017-08-29 RX ADMIN — Medication 2 GRAM(S): at 18:00

## 2017-08-29 RX ADMIN — Medication 100 MILLIGRAM(S): at 17:59

## 2017-08-29 RX ADMIN — Medication 150 MILLIGRAM(S): at 22:10

## 2017-08-29 RX ADMIN — AMLODIPINE BESYLATE 5 MILLIGRAM(S): 2.5 TABLET ORAL at 06:50

## 2017-08-29 RX ADMIN — Medication 100 MILLIGRAM(S): at 06:49

## 2017-08-29 RX ADMIN — Medication 50 GRAM(S): at 18:00

## 2017-08-29 NOTE — PROGRESS NOTE ADULT - ASSESSMENT
Pt with above mentioned medical problems as mentioned above, was seen in [facility] 4 days ago and treated for cellulitis, discharged home on clindamycin and was called back for positive blood culture showing gram positive cocci in pairs and chains. Pt has no complaints at this time except constipation. S/p Vanco and zosyn in ED, evaluated by ID and recommended keep patient on vancomycin and d/c clindamycin.    Bacteremia: Positive blood culture.  - Group B strep, being treated for RLE cellulitis with clindamycin from [facility].  - No fever or leukocytosis  - Seen by ID, started on vancomycin.  - Blood cultures ordered.  - ID f/u noted and appreciated.    Left Hip pain:  - CT pelvis w/o contrast showed mass n mid pelvis mesentry suspicious for malignancy  - CT abd/pelvis w/ contrast ordered to evaluate mass further.  - Percocet for pain.    Constipation:  - C/w bowel regimen.    History of melanoma, PVD, HTN, HLD:  - needs more collateral info from PMD, I called his office but unreachable.  - Resume home medication as reconciled with antihypertensive at lower dose for now. Pt with above mentioned medical problems as mentioned above, was seen in [facility] 4 days ago and treated for cellulitis, discharged home on clindamycin and was called back for positive blood culture showing gram positive cocci in pairs and chains. Pt has no complaints at this time except constipation. S/p Vanco and zosyn in ED, evaluated by ID and recommended keep patient on vancomycin and d/c clindamycin.    Bacteremia: Positive blood culture.  - Group B strep, being treated for RLE cellulitis with clindamycin from [facility].  - No fever or leukocytosis  - Seen by ID, started on vancomycin.  - Blood cultures ordered.  - ID f/u noted and appreciated.    Left Hip pain:  - CT pelvis w/o contrast showed mass in mid pelvis mesentry suspicious for malignancy  - CT abd/pelvis w/ contrast ordered to evaluate mass further.  - Percocet for pain.    Constipation:  - C/w bowel regimen.    History of melanoma, PVD, HTN, HLD:  - needs more collateral info from PM.  - Resume home medication as reconciled with antihypertensive at lower dose for now.       Addendum:  I spoke to her PMD Dr Laughlin, per him patient has h/o if melanoma s/p surgery with no metastasis, HTN, HLD, PVD, chronic venous insufficiency, Aortic insufficiency, recurrent UTI on Hipprex 1 gm daily. He further stated patient does not have any prior known metastatic, remote hx of localized melanoma, basal cell carcinoma and squamous cell carcinoma. Per him patient did not had any GI or pelvic malignancy thought she had hx of colonic polyps. I updated him about CT pelvis finding of mass which was kind of new to him.  Home medication verified and reconciled

## 2017-08-30 LAB — VANCOMYCIN TROUGH SERPL-MCNC: 6.4 UG/ML — LOW (ref 10–20)

## 2017-08-30 PROCEDURE — 99233 SBSQ HOSP IP/OBS HIGH 50: CPT

## 2017-08-30 PROCEDURE — 74177 CT ABD & PELVIS W/CONTRAST: CPT | Mod: 26

## 2017-08-30 RX ORDER — PENICILLIN G POTASSIUM 5000000 [IU]/1
4 POWDER, FOR SOLUTION INTRAMUSCULAR; INTRAPLEURAL; INTRATHECAL; INTRAVENOUS EVERY 4 HOURS
Qty: 0 | Refills: 0 | Status: DISCONTINUED | OUTPATIENT
Start: 2017-08-30 | End: 2017-09-01

## 2017-08-30 RX ORDER — SACCHAROMYCES BOULARDII 250 MG
250 POWDER IN PACKET (EA) ORAL
Qty: 0 | Refills: 0 | Status: DISCONTINUED | OUTPATIENT
Start: 2017-08-30 | End: 2017-09-01

## 2017-08-30 RX ADMIN — Medication 50 MILLIGRAM(S): at 12:33

## 2017-08-30 RX ADMIN — Medication 1000 UNIT(S): at 12:32

## 2017-08-30 RX ADMIN — ENOXAPARIN SODIUM 40 MILLIGRAM(S): 100 INJECTION SUBCUTANEOUS at 12:32

## 2017-08-30 RX ADMIN — Medication 100 MILLIGRAM(S): at 18:43

## 2017-08-30 RX ADMIN — Medication 250 MILLIGRAM(S): at 18:43

## 2017-08-30 RX ADMIN — Medication 2 GRAM(S): at 18:43

## 2017-08-30 RX ADMIN — PENICILLIN G POTASSIUM 100 MILLION UNIT(S): 5000000 POWDER, FOR SOLUTION INTRAMUSCULAR; INTRAPLEURAL; INTRATHECAL; INTRAVENOUS at 15:33

## 2017-08-30 RX ADMIN — Medication 2 GRAM(S): at 12:33

## 2017-08-30 RX ADMIN — PENICILLIN G POTASSIUM 100 MILLION UNIT(S): 5000000 POWDER, FOR SOLUTION INTRAMUSCULAR; INTRAPLEURAL; INTRATHECAL; INTRAVENOUS at 09:37

## 2017-08-30 RX ADMIN — Medication 81 MILLIGRAM(S): at 12:32

## 2017-08-30 RX ADMIN — AMLODIPINE BESYLATE 5 MILLIGRAM(S): 2.5 TABLET ORAL at 05:38

## 2017-08-30 RX ADMIN — SENNA PLUS 2 TABLET(S): 8.6 TABLET ORAL at 21:49

## 2017-08-30 RX ADMIN — POLYETHYLENE GLYCOL 3350 17 GRAM(S): 17 POWDER, FOR SOLUTION ORAL at 12:32

## 2017-08-30 RX ADMIN — PENICILLIN G POTASSIUM 100 MILLION UNIT(S): 5000000 POWDER, FOR SOLUTION INTRAMUSCULAR; INTRAPLEURAL; INTRATHECAL; INTRAVENOUS at 18:44

## 2017-08-30 RX ADMIN — OXYCODONE AND ACETAMINOPHEN 1 TABLET(S): 5; 325 TABLET ORAL at 05:58

## 2017-08-30 RX ADMIN — PENICILLIN G POTASSIUM 100 MILLION UNIT(S): 5000000 POWDER, FOR SOLUTION INTRAMUSCULAR; INTRAPLEURAL; INTRATHECAL; INTRAVENOUS at 21:49

## 2017-08-30 RX ADMIN — ATORVASTATIN CALCIUM 10 MILLIGRAM(S): 80 TABLET, FILM COATED ORAL at 21:49

## 2017-08-30 RX ADMIN — Medication 100 MILLIGRAM(S): at 05:38

## 2017-08-30 NOTE — PROGRESS NOTE ADULT - ASSESSMENT
Pt with above mentioned medical problems as mentioned above, was seen in [facility] 4 days ago and treated for cellulitis, discharged home on clindamycin and was called back for positive blood culture showing gram positive cocci in pairs and chains.  S/p Vanco and zosyn in ED, evaluated by ID and recommended keep patient on vancomycin and d/c clindamycin.    Bacteremia: Positive blood culture.  - Group B strep, treated for RLE cellulitis with clindamycin from [facility].  - No fever or leukocytosis  - Seen by ID, started on vancomycin - now changed to PCN G.  - Blood cultures ordered.    Left Hip pain:  - CT pelvis w/o contrast showed mass in mid pelvis mesentry suspicious for malignancy  - CT abd/pelvis w/ contrast ordered to evaluate mass further.  - Percocet for pain.    Constipation:  - C/w bowel regimen.    HTN:  -Continue Norvasc.    History of melanoma  s/p surgery with no metastasis

## 2017-08-31 LAB
CULTURE RESULTS: NO GROWTH — SIGNIFICANT CHANGE UP
SPECIMEN SOURCE: SIGNIFICANT CHANGE UP

## 2017-08-31 PROCEDURE — 99232 SBSQ HOSP IP/OBS MODERATE 35: CPT

## 2017-08-31 PROCEDURE — 99233 SBSQ HOSP IP/OBS HIGH 50: CPT

## 2017-08-31 RX ORDER — PENICILLIN G POTASSIUM 5000000 [IU]/1
24 POWDER, FOR SOLUTION INTRAMUSCULAR; INTRAPLEURAL; INTRATHECAL; INTRAVENOUS
Qty: 11 | Refills: 0 | OUTPATIENT
Start: 2017-08-31 | End: 2017-09-11

## 2017-08-31 RX ADMIN — Medication 100 MILLIGRAM(S): at 14:43

## 2017-08-31 RX ADMIN — PENICILLIN G POTASSIUM 100 MILLION UNIT(S): 5000000 POWDER, FOR SOLUTION INTRAMUSCULAR; INTRAPLEURAL; INTRATHECAL; INTRAVENOUS at 21:40

## 2017-08-31 RX ADMIN — PENICILLIN G POTASSIUM 100 MILLION UNIT(S): 5000000 POWDER, FOR SOLUTION INTRAMUSCULAR; INTRAPLEURAL; INTRATHECAL; INTRAVENOUS at 18:51

## 2017-08-31 RX ADMIN — PENICILLIN G POTASSIUM 100 MILLION UNIT(S): 5000000 POWDER, FOR SOLUTION INTRAMUSCULAR; INTRAPLEURAL; INTRATHECAL; INTRAVENOUS at 05:32

## 2017-08-31 RX ADMIN — Medication 81 MILLIGRAM(S): at 09:25

## 2017-08-31 RX ADMIN — ENOXAPARIN SODIUM 40 MILLIGRAM(S): 100 INJECTION SUBCUTANEOUS at 09:25

## 2017-08-31 RX ADMIN — PENICILLIN G POTASSIUM 100 MILLION UNIT(S): 5000000 POWDER, FOR SOLUTION INTRAMUSCULAR; INTRAPLEURAL; INTRATHECAL; INTRAVENOUS at 02:30

## 2017-08-31 RX ADMIN — SENNA PLUS 2 TABLET(S): 8.6 TABLET ORAL at 21:40

## 2017-08-31 RX ADMIN — Medication 250 MILLIGRAM(S): at 14:42

## 2017-08-31 RX ADMIN — Medication 100 MILLIGRAM(S): at 05:32

## 2017-08-31 RX ADMIN — Medication 50 MILLIGRAM(S): at 09:25

## 2017-08-31 RX ADMIN — Medication 2 GRAM(S): at 05:32

## 2017-08-31 RX ADMIN — AMLODIPINE BESYLATE 5 MILLIGRAM(S): 2.5 TABLET ORAL at 05:32

## 2017-08-31 RX ADMIN — Medication 2 GRAM(S): at 14:42

## 2017-08-31 RX ADMIN — Medication 250 MILLIGRAM(S): at 05:33

## 2017-08-31 RX ADMIN — PENICILLIN G POTASSIUM 100 MILLION UNIT(S): 5000000 POWDER, FOR SOLUTION INTRAMUSCULAR; INTRAPLEURAL; INTRATHECAL; INTRAVENOUS at 09:25

## 2017-08-31 RX ADMIN — Medication 1000 UNIT(S): at 09:25

## 2017-08-31 RX ADMIN — ATORVASTATIN CALCIUM 10 MILLIGRAM(S): 80 TABLET, FILM COATED ORAL at 21:40

## 2017-08-31 RX ADMIN — PENICILLIN G POTASSIUM 100 MILLION UNIT(S): 5000000 POWDER, FOR SOLUTION INTRAMUSCULAR; INTRAPLEURAL; INTRATHECAL; INTRAVENOUS at 14:41

## 2017-08-31 NOTE — PROCEDURE NOTE - NSPROCDETAILS_GEN_ALL_CORE
ultrasound guidance/location identified, draped/prepped, sterile technique used/ultrasound assessment

## 2017-08-31 NOTE — PROGRESS NOTE ADULT - ASSESSMENT
Pt with above mentioned medical problems as mentioned above, was seen in [facility] 4 days ago and treated for cellulitis, discharged home on clindamycin and was called back for positive blood culture showing gram positive cocci in pairs and chains.  S/p Vanco and zosyn in ED, evaluated by ID and recommended keep patient on vancomycin and d/c clindamycin.    Bacteremia: Positive blood culture.  - Group B strep, treated for RLE cellulitis with clindamycin from [facility].  - No fever or leukocytosis  - Seen by ID, started on vancomycin - now changed to PCN G.  - Repeat Blood cultures negative    Left Hip pain:  - CT pelvis w/o contrast showed mass in mid pelvis mesentry suspicious for malignancy  - CT abd/pelvis w/ contrast reviewed.  Will discuss with patient further.  - Percocet for pain.    Constipation:  - C/w bowel regimen.    HTN:  -Continue Norvasc.    History of melanoma  s/p surgery with no metastasis

## 2017-09-01 ENCOUNTER — TRANSCRIPTION ENCOUNTER (OUTPATIENT)
Age: 82
End: 2017-09-01

## 2017-09-01 VITALS
SYSTOLIC BLOOD PRESSURE: 119 MMHG | RESPIRATION RATE: 18 BRPM | HEART RATE: 80 BPM | OXYGEN SATURATION: 100 % | DIASTOLIC BLOOD PRESSURE: 54 MMHG | TEMPERATURE: 99 F

## 2017-09-01 PROCEDURE — 99239 HOSP IP/OBS DSCHRG MGMT >30: CPT

## 2017-09-01 PROCEDURE — 99232 SBSQ HOSP IP/OBS MODERATE 35: CPT

## 2017-09-01 RX ORDER — AMLODIPINE BESYLATE AND BENAZEPRIL HYDROCHLORIDE 10; 20 MG/1; MG/1
1 CAPSULE ORAL
Qty: 0 | Refills: 0 | COMMUNITY

## 2017-09-01 RX ORDER — SOD,AMMONIUM,POTASSIUM LACTATE
1 CREAM (GRAM) TOPICAL
Qty: 1 | Refills: 0 | OUTPATIENT
Start: 2017-09-01 | End: 2017-10-01

## 2017-09-01 RX ORDER — ATORVASTATIN CALCIUM 80 MG/1
1 TABLET, FILM COATED ORAL
Qty: 13 | Refills: 0 | OUTPATIENT
Start: 2017-09-01 | End: 2017-10-01

## 2017-09-01 RX ORDER — ESTRADIOL 4 UG/1
0 INSERT VAGINAL
Qty: 0 | Refills: 0 | COMMUNITY

## 2017-09-01 RX ORDER — ASPIRIN/CALCIUM CARB/MAGNESIUM 324 MG
1 TABLET ORAL
Qty: 30 | Refills: 0 | OUTPATIENT
Start: 2017-09-01 | End: 2017-10-01

## 2017-09-01 RX ORDER — ASPIRIN/CALCIUM CARB/MAGNESIUM 324 MG
0 TABLET ORAL
Qty: 0 | Refills: 0 | COMMUNITY

## 2017-09-01 RX ORDER — ATORVASTATIN CALCIUM 80 MG/1
1 TABLET, FILM COATED ORAL
Qty: 0 | Refills: 0 | COMMUNITY

## 2017-09-01 RX ORDER — PYRIDOXINE HCL (VITAMIN B6) 100 MG
1 TABLET ORAL
Qty: 0 | Refills: 0 | COMMUNITY

## 2017-09-01 RX ORDER — OMEGA-3 ACID ETHYL ESTERS 1 G
1 CAPSULE ORAL
Qty: 0 | Refills: 0 | COMMUNITY

## 2017-09-01 RX ORDER — CHOLECALCIFEROL (VITAMIN D3) 125 MCG
1 CAPSULE ORAL
Qty: 0 | Refills: 0 | COMMUNITY

## 2017-09-01 RX ORDER — PYRIDOXINE HCL (VITAMIN B6) 100 MG
1 TABLET ORAL
Qty: 30 | Refills: 0 | OUTPATIENT
Start: 2017-09-01 | End: 2017-10-01

## 2017-09-01 RX ORDER — OMEGA-3 ACID ETHYL ESTERS 1 G
1 CAPSULE ORAL
Qty: 30 | Refills: 0 | OUTPATIENT
Start: 2017-09-01 | End: 2017-10-01

## 2017-09-01 RX ORDER — AMLODIPINE BESYLATE AND BENAZEPRIL HYDROCHLORIDE 10; 20 MG/1; MG/1
1 CAPSULE ORAL
Qty: 30 | Refills: 0 | OUTPATIENT
Start: 2017-09-01 | End: 2017-10-01

## 2017-09-01 RX ORDER — SOD,AMMONIUM,POTASSIUM LACTATE
1 CREAM (GRAM) TOPICAL
Qty: 0 | Refills: 0 | COMMUNITY

## 2017-09-01 RX ORDER — METHENAMINE MANDELATE 1 G
1 TABLET ORAL
Qty: 0 | Refills: 0 | COMMUNITY

## 2017-09-01 RX ORDER — METHENAMINE MANDELATE 1 G
1 TABLET ORAL
Qty: 30 | Refills: 0 | OUTPATIENT
Start: 2017-09-01 | End: 2017-10-01

## 2017-09-01 RX ADMIN — PENICILLIN G POTASSIUM 100 MILLION UNIT(S): 5000000 POWDER, FOR SOLUTION INTRAMUSCULAR; INTRAPLEURAL; INTRATHECAL; INTRAVENOUS at 13:29

## 2017-09-01 RX ADMIN — PENICILLIN G POTASSIUM 100 MILLION UNIT(S): 5000000 POWDER, FOR SOLUTION INTRAMUSCULAR; INTRAPLEURAL; INTRATHECAL; INTRAVENOUS at 02:24

## 2017-09-01 RX ADMIN — Medication 2 GRAM(S): at 05:31

## 2017-09-01 RX ADMIN — PENICILLIN G POTASSIUM 100 MILLION UNIT(S): 5000000 POWDER, FOR SOLUTION INTRAMUSCULAR; INTRAPLEURAL; INTRATHECAL; INTRAVENOUS at 05:31

## 2017-09-01 RX ADMIN — ENOXAPARIN SODIUM 40 MILLIGRAM(S): 100 INJECTION SUBCUTANEOUS at 13:29

## 2017-09-01 RX ADMIN — Medication 81 MILLIGRAM(S): at 13:29

## 2017-09-01 RX ADMIN — PENICILLIN G POTASSIUM 100 MILLION UNIT(S): 5000000 POWDER, FOR SOLUTION INTRAMUSCULAR; INTRAPLEURAL; INTRATHECAL; INTRAVENOUS at 10:00

## 2017-09-01 RX ADMIN — OXYCODONE AND ACETAMINOPHEN 1 TABLET(S): 5; 325 TABLET ORAL at 04:11

## 2017-09-01 RX ADMIN — POLYETHYLENE GLYCOL 3350 17 GRAM(S): 17 POWDER, FOR SOLUTION ORAL at 13:29

## 2017-09-01 RX ADMIN — Medication 1000 UNIT(S): at 13:30

## 2017-09-01 RX ADMIN — Medication 50 MILLIGRAM(S): at 13:29

## 2017-09-01 RX ADMIN — OXYCODONE AND ACETAMINOPHEN 1 TABLET(S): 5; 325 TABLET ORAL at 05:02

## 2017-09-01 RX ADMIN — Medication 250 MILLIGRAM(S): at 05:31

## 2017-09-01 RX ADMIN — Medication 100 MILLIGRAM(S): at 05:31

## 2017-09-01 NOTE — DISCHARGE NOTE ADULT - HOSPITAL COURSE
Called in for positive blood culture.     89 y/o female w/ hx of melanoma, PVD, HTN, HLD, seen in ED with HHA at bedside. History is very limited, obtained from patient HHA and medical records. She was recently seen in [facility] on 8/24 and found to have RLE cellulitis, blood cx were drawn and patient was discharged from ED on oral clindamycin. Pt was called back from [facility] for positive blood culture and she was told to come back to ED. Per patient and HHA her RLE cellulitis is much better with clindamycin. She reported constipation otherwise denied any active complaints, no fever, chills, nausea, vomiting, headache, dizziness, chest pain, SOB or palpitation.  In ED patient got vancomycin and zosyn and blood work ordered including repeat blood culture. ID was also consulted.    Started on vancomycin. ID change abx to Penicillin G 4mu iv q4 hrs. Sepsis due to Streptococcus, group B.  Repeat blood cultures no growth 8/28/17  Per ID specialist dr Altman - Does not want CHLOE, will do surveillance cultures after antibiotics  Will need weekly CBC and CMP faxed to [phone]  Appointment with us in 7 to 10 days - [phone]  End Date Sept. 12, 201    New concerned was discussed with patient and niece of abd CT finding of peritoneal carcinomatosis and a pelvic mass.  I explained that a malignant process is likely and patient is to follow up with a hematology oncology group on discharge. I spoke to patient primary care provider office dr Ashok Laughlin at [phone] and made appointment for 11.00am on Tuesday 9/5/17.  Primary care will arrange hematology oncology evaluation for patient.

## 2017-09-01 NOTE — PROGRESS NOTE ADULT - PROBLEM SELECTOR PLAN 2
Reports rash to cephalosporin   Tolerated a dose of Zosyn in the ED.   Tolerating PCN
Reports rash to cephalosporin   Tolerated a dose of Zosyn in the ED.   Tolerating PCN
Reports rash to cephalosporin   Tolerated a dose of Zosyn in the ED.   Will trial PCN today. Has taken Amoxicillin prior many years ago with no reactions.
Reports rash to cephalosporin   Tolerated a dose of Zosyn in the ED.   Will trial PCN tomorrow.

## 2017-09-01 NOTE — DISCHARGE NOTE ADULT - SECONDARY DIAGNOSIS.
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

## 2017-09-01 NOTE — PROGRESS NOTE ADULT - SUBJECTIVE AND OBJECTIVE BOX
CC: pt was called to come to the hospital due to positive blood cultures     HPI:  89 y/o female w/ hx of melanoma, PVD, HTN, HLD, who was recently seen in [facility] on 8/24 and found to have RLE cellulitis, blood cx were drawn and patient was discharged from ED on oral clindamycin. Pt was called back from [facility] for positive blood culture and she was told to come back to ED. Per patient and HHA her RLE cellulitis is much better with clindamycin.   In ED patient got vancomycin and zosyn and blood work ordered including repeat blood culture. ID was also consulted.     INTERVAL HPI/OVERNIGHT EVENTS: Patient seen and examined lying in bed.  Patient complaining of bilateral hip pain.  Patient denies any headache, dizziness, SOB, CP, abdominal pain, nausea, vomiting, dysuria.  Other ROS reviewed and are negative.    Vital Signs Last 24 Hrs  T(C): 36.5 (31 Aug 2017 07:35), Max: 36.8 (31 Aug 2017 01:12)  T(F): 97.7 (31 Aug 2017 07:35), Max: 98.3 (31 Aug 2017 01:12)  HR: 66 (31 Aug 2017 07:35) (66 - 76)  BP: 109/59 (31 Aug 2017 07:35) (109/59 - 124/64)  BP(mean): --  RR: 18 (31 Aug 2017 07:35) (18 - 18)  SpO2: 95% (31 Aug 2017 07:35) (94% - 96%)  I&O's Detail      PHYSICAL EXAM:  GENERAL: NAD, well-groomed, well-developed  NECK: Supple, No JVD, Normal thyroid  NERVOUS SYSTEM:  Alert & Oriented X3, Good concentration; Motor Strength 5/5 B/L upper and lower extremities  CHEST/LUNG: Clear to auscultation bilaterally; No rales, rhonchi, wheezing, or rubs  HEART: Regular rate and rhythm; No murmurs, rubs, or gallops  ABDOMEN: Soft, Nontender, Nondistended; Bowel sounds present  EXTREMITIES:  2+ Peripheral Pulses, No clubbing, cyanosis, or edema  SKIN: No rashes or lesions        MEDICATIONS  (STANDING):  atorvastatin 10 milliGRAM(s) Oral at bedtime  aspirin  chewable 81 milliGRAM(s) Oral daily  polyethylene glycol 3350 17 Gram(s) Oral daily  senna 2 Tablet(s) Oral at bedtime  amLODIPine   Tablet 5 milliGRAM(s) Oral daily  docusate sodium 100 milliGRAM(s) Oral two times a day  enoxaparin Injectable 40 milliGRAM(s) SubCutaneous daily  omega-3-Acid Ethyl Esters 2 Gram(s) Oral two times a day  cholecalciferol 1000 Unit(s) Oral daily  pyridoxine 50 milliGRAM(s) Oral daily  penicillin   G  potassium  IVPB 4 Million Unit(s) IV Intermittent every 4 hours  saccharomyces boulardii 250 milliGRAM(s) Oral two times a day    MEDICATIONS  (PRN):  oxyCODONE    5 mG/acetaminophen 325 mG 1 Tablet(s) Oral every 4 hours PRN Moderate Pain (4 - 6)      RADIOLOGY & ADDITIONAL TESTS:  < from: CT Abdomen and Pelvis w/ IV Cont (08.30.17 @ 11:39) >   EXAM:  CT ABDOMEN AND PELVIS IC                          PROCEDURE DATE:  08/30/2017          INTERPRETATION:  Clinical information: Pelvic mass seen on prior imaging    Comparison: CT pelvis 8/28    PROCEDURE:   CT of the Abdomen and Pelvis was performed with intravenous contrast.  90ml of Omnipaque 350 was injected intravenously. 10cc was discarded.    FINDINGS:    LOWER CHEST: Mild to moderate pleural effusions with underlying   atelectasis.    ABDOMEN:  LIVER: 3-5 subcentimeter hypodensities in both lobes to small to   characterize.  BILE DUCTS: Mild dilatation  GALLBLADDER: Cholelithiasis  PANCREAS: Within normal limits  SPLEEN: Within normal limits  ADRENALS: Within normal limits  KIDNEYS: Bilateral cysts. Bilateral nonobstructing lower pole   intracalyceal renal calculi up to 1 cm on the right. No hydronephrosis.    PELVIS:  REPRODUCTIVE ORGANS: 2.6 cm anterior myometrial uterine lesion, possibly   fibroid but indeterminate.  BLADDER: Within normal limits    PERITONEUM:Loculatedpelvic ascites right greater than left. Extensive   peritoneal carcinomatosis in the abdomen and pelvis. Multiple peritoneal   and retroperitoneal implants the largest 3.6 cm hepatorenal recess and   left lower quadrant mesentery 3.2 cm.  BOWEL: Large amount of stool in the colon. The appendix is normal. No   bowel obstruction. Scattered subcentimeter serosal implants.  VESSELS: Diffuse atrophic calcifications.  RETROPERITONEUM: 1.2 cm celiac axis lymph node, mesenteric   adenopathy/implants.  ABDOMINAL WALL: Question 2 cm implant at the umbilicus. 1 cm left flank   deep subcutaneous implant.  MUSCULOSKELETAL: Within normal limits    IMPRESSION:     Extensive peritoneal and retroperitoneal carcinomatosis. Left flank   abdominal wall nodule. Possible umbilicus abdominal wall nodule.    Mild to moderate complex pelvic ascites.    Uterine lesion, possibly fibroid but indeterminate.    Hepatic hypodensities too small to characterize, it be further   characterized with MRI.    Other incidentalcomments as above.                    HARRISON HITCHCOCK M.D., ATTENDING RADIOLOGIST  This document has been electronically signed. Aug 30 2017  2:16PM              < end of copied text >
CC: pt was called to come to the hospital due to positive blood cultures     HPI:  89 y/o female w/ hx of melanoma, PVD, HTN, HLD, who was recently seen in [facility] on 8/24 and found to have RLE cellulitis, blood cx were drawn and patient was discharged from ED on oral clindamycin. Pt was called back from [facility] for positive blood culture and she was told to come back to ED. Per patient and HHA her RLE cellulitis is much better with clindamycin.   In ED patient got vancomycin and zosyn and blood work ordered including repeat blood culture. ID was also consulted.     INTERVAL HPI/OVERNIGHT EVENTS: Patient seen and examined.  Patient denies any headache, dizziness, SOB, CP, abdominal pain, nausea, vomiting, dysuria.  Other ROS reviewed and are negative.    Vital Signs Last 24 Hrs  T(C): 36.9 (30 Aug 2017 07:30), Max: 36.9 (29 Aug 2017 23:44)  T(F): 98.5 (30 Aug 2017 07:30), Max: 98.5 (29 Aug 2017 23:44)  HR: 65 (30 Aug 2017 07:30) (65 - 77)  BP: 106/44 (30 Aug 2017 07:30) (106/44 - 123/66)  BP(mean): --  RR: 18 (30 Aug 2017 07:30) (18 - 18)  SpO2: 96% (30 Aug 2017 07:30) (94% - 96%)  I&O's Detail    PHYSICAL EXAM:  GENERAL: NAD  NECK: Supple, No JVD, Normal thyroid  NERVOUS SYSTEM:  Alert & Oriented X3, Good concentration; Motor Strength 5/5 B/L upper and lower extremities  CHEST/LUNG: Clear to auscultation bilaterally; No rales, rhonchi, wheezing, or rubs  HEART: Regular rate and rhythm; No murmurs, rubs, or gallops  ABDOMEN: Soft, Nontender, Nondistended; Bowel sounds present  EXTREMITIES:  2+ Peripheral Pulses, No clubbing, cyanosis, or edema  SKIN: No rashes or lesions                              10.3   6.9   )-----------( 168      ( 29 Aug 2017 08:06 )             32.5     29 Aug 2017 08:06    136    |  99     |  11.0   ----------------------------<  92     4.3     |  24.0   |  0.32     Ca    7.9        29 Aug 2017 08:06  Mg     1.7       29 Aug 2017 08:06    TPro  6.4    /  Alb  3.0    /  TBili  0.5    /  DBili  x      /  AST  37     /  ALT  39     /  AlkPhos  99     28 Aug 2017 18:17        LIVER FUNCTIONS - ( 28 Aug 2017 18:17 )  Alb: 3.0 g/dL / Pro: 6.4 g/dL / ALK PHOS: 99 U/L / ALT: 39 U/L / AST: 37 U/L / GGT: x               MEDICATIONS  (STANDING):  atorvastatin 10 milliGRAM(s) Oral at bedtime  aspirin  chewable 81 milliGRAM(s) Oral daily  polyethylene glycol 3350 17 Gram(s) Oral daily  senna 2 Tablet(s) Oral at bedtime  amLODIPine   Tablet 5 milliGRAM(s) Oral daily  docusate sodium 100 milliGRAM(s) Oral two times a day  enoxaparin Injectable 40 milliGRAM(s) SubCutaneous daily  omega-3-Acid Ethyl Esters 2 Gram(s) Oral two times a day  cholecalciferol 1000 Unit(s) Oral daily  pyridoxine 50 milliGRAM(s) Oral daily  penicillin   G  potassium  IVPB 4 Million Unit(s) IV Intermittent every 4 hours  saccharomyces boulardii 250 milliGRAM(s) Oral two times a day    MEDICATIONS  (PRN):  oxyCODONE    5 mG/acetaminophen 325 mG 1 Tablet(s) Oral every 4 hours PRN Moderate Pain (4 - 6)      RADIOLOGY & ADDITIONAL TESTS:  < from: CT Pelvis Bony Only No Cont (08.28.17 @ 17:05) >     EXAM:  CT PELVIS BONY ONLY                         EXAM:  CT 3D RECONSTRUCT W RevolutTARAFAL                          PROCEDURE DATE:  08/28/2017          INTERPRETATION:  CT PELVIS BONY ONLY, CT 3D RECONSTRUCT W RevolutTATON dated   8/28/2017 5:05 PM     INDICATION: Left hip pain    COMPARISON: Pelvic radiographs of the same date    TECHNIQUE: CT imaging of the pelvis was performed. The data was   reformatted in the axial, coronal, and sagittal planes. Additionally, 3-D   reformatted imaging was created at a separate workstation.    FINDINGS:   Evaluation of the osseous structures demonstrates mildly decreased bone   mineralization which limits detailed evaluation. Given this limitation,   no displaced fracture is identified. There is spurring and productive   change about both acetabulum. There is moderate spurring at the pubic   symphysis. A moderate levocurvature of the lower lumbar spine with severe   multilevel spondylosis is noted. The sacroiliac joints appear intact.    Evaluation of the soft tissues demonstrates a symmetric appearance of the   muscle bulk. No intramuscular hematoma is identified. The proximal   hamstring tendons demonstrate moderate enthesophyte changes bilaterally.   The iliopsoas tendon is intact bilaterally. There is spurring and   productive change of both abductor tendon insertions.    There is a mild to moderate amount of pelvic ascites. There is   cholelithiasis. There are multiple nonenlarged mesenteric lymph nodes   within the right abdomen. There is a 1.1 cm right periportal lymph node.   There is a 1.8 cm hypodense left inguinal lymph node. There is a mid   pelvic mesenteric mass that measures approximately 2.1 x 2.8 x 2.3 cm.   There is a heterogeneous appearance of the uterus. There are   calcifications which are nonobstructing at the lower visualized portions   of the kidneys bilaterally. There is a small fat-containing umbilical   hernia. There are scattered colonic diverticula.    There is moderate soft tissue swelling about the pelvis.No soft tissue   mass is seen. Moderate amount of vascular calcification within the   infrarenal abdominal aorta and its major branches.    3-D reformatted imaging confirms these findings.      IMPRESSION:   1.  Limited by decreased bone mineralization. No displaced fracture.  2.  Mass in the mid pelvic mesentery suspicious for neoplasm. Mild to   moderate abdominal ascites with lymphadenopathy as described above.   Heterogeneous appearance of the uterus. Dedicated CT the abdomen and   pelvis canbe performed for further evaluation.  3.  Degenerative changes as above.  4.  Cholelithiasis.                GABRIELE OWEN M.D., ATTENDING RADIOLOGIST  This document has been electronically signed. Aug 28 2017  5:21PM          < end of copied text >
[facility]  INFECTIOUS DISEASES AND INTERNAL MEDICINE OF [place]  =======================================================  Krunal Delacruz MD  Diplomates American Board of Internal Medicine and Infectious Diseases  =======================================================      MAYRA BROWN [id number]    Follow up: Bacteremia    No fevers or chills  No complaints    Allergies:  Bactrim (Unknown)  Neosporin (Rash)      Antibiotics:   vancomycin  IVPB 750 milliGRAM(s) IV Intermittent <User Schedule>        REVIEW OF SYSTEMS:  CONSTITUTIONAL:  No Fever or chills  HEENT:  No diplopia or blurred vision.  No earache, sore throat or runny nose.  CARDIOVASCULAR:  No pressure, squeezing, strangling, tightness, heaviness or aching about the chest, neck, axilla or epigastrium.  RESPIRATORY:  No cough, shortness of breath  GASTROINTESTINAL:  No nausea, vomiting or diarrhea.  GENITOURINARY:  No dysuria, frequency or urgency  MUSCULOSKELETAL:  no joint aches, no muscle pain  SKIN:  No change in skin, hair or nails.  NEUROLOGIC:  No paresthesias, fasciculations  PSYCHIATRIC:  No disorder of thought or mood.  ENDOCRINE:  No heat or cold intolerance  HEMATOLOGICAL:  No easy bruising or bleeding.       Physical Exam:  Vital Signs Last 24 Hrs  T(C): 37 (29 Aug 2017 07:40), Max: 37 (29 Aug 2017 07:40)  T(F): 98.6 (29 Aug 2017 07:40), Max: 98.6 (29 Aug 2017 07:40)  HR: 67 (29 Aug 2017 07:40) (65 - 78)  BP: 118/59 (29 Aug 2017 07:40) (102/54 - 133/84)  RR: 18 (29 Aug 2017 07:40) (16 - 18)  SpO2: 95% (29 Aug 2017 07:40) (95% - 98%)      GEN: NAD, pleasant  HEENT: normocephalic and atraumatic. EOMI. PERRL.  +hearing loss, chronic  NECK: Supple  LUNGS: Clear to auscultation.  HEART: Regular rate and rhythm   ABDOMEN: Soft, nontender, and nondistended.  Positive bowel sounds.    : No CVA tenderness  EXTREMITIES: LLE with healed ulcer, no erythema. B/L Tyrel toes.  MSK: No joint swelling  NEUROLOGIC: no focal deficits  PSYCHIATRIC: Appropriate affect .  SKIN: No rash      Labs:  08-29    136  |  99  |  11.0  ----------------------------<  92  4.3   |  24.0  |  0.32<L>    Ca    7.9<L>      29 Aug 2017 08:06  Mg     1.7     08-29    TPro  6.4<L>  /  Alb  3.0<L>  /  TBili  0.5  /  DBili  x   /  AST  37<H>  /  ALT  39<H>  /  AlkPhos  99  08-28                          10.3   6.9   )-----------( 168      ( 29 Aug 2017 08:06 )             32.5       LIVER FUNCTIONS - ( 28 Aug 2017 18:17 )  Alb: 3.0 g/dL / Pro: 6.4 g/dL / ALK PHOS: 99 U/L / ALT: 39 U/L / AST: 37 U/L / GGT: x             RECENT CULTURES:  Culture - Blood (08.24.17 @ 16:22)    Gram Stain:   Growth in aerobic and anaerobic bottles: Gram Positive Cocci in Pairs and  Chains    Specimen Source: .Blood None    Culture Results:   Growth in aerobic and anaerobic bottles: Streptococcus agalactiae (Group  B)  See previous culture [id number]      Culture - Blood (08.24.17 @ 16:03)    -  Levofloxacin: S [id number]    -  Cefazolin: S    -  Vancomycin: S [id number]    -  Penicillin: S    -  Streptococcus agalactiae (Group B): Detec    Gram Stain:   Growth in aerobic and anaerobic bottles: Gram Positive Cocci in Pairs and  Chains    -  Clindamycin: S    Specimen Source: .Blood None    Organism: Blood Culture PCR    Organism: Streptococcus agalactiae (Group B)    Culture Results:   Growth in aerobic and anaerobic bottles: Streptococcus agalactiae (Group  B)  ***Blood Panel PCR results on this specimen are available  approximately 3 hours after the Gram stain result.***  Gram stain, PCR, and/or culture results may not always  correspond due to difference in methodologies.    Organism Identification: Blood Culture PCR  Streptococcus agalactiae (Group B)    Method Type: KB    Method Type: PCR      TTE   Summary:   1. Left ventricular ejection fraction, by visual estimation, is 55 to   60%.   2. Normal global left ventricular systolic function.   3. Spectral Doppler shows impaired relaxation pattern of left   ventricular myocardial filling (Grade I diastolic dysfunction).   4. Mild mitral annular calcification.   5. Mild aortic regurgitation.   6. Sclerotic aortic valve with normal opening.   7. Moderately enlarged left atrium.
[facility]  INFECTIOUS DISEASES AND INTERNAL MEDICINE OF [place]  =======================================================  Krunal Delacruz MD  Diplomates American Board of Internal Medicine and Infectious Diseases  =======================================================      MAYRA BROWN [id number]    Follow up: Bacteremia    No fevers or chills  No complaints    Allergies:  Bactrim (Unknown)  Neosporin (Rash)      Antibiotics:   vancomycin  IVPB 750 milliGRAM(s) IV Intermittent every 12 hours       REVIEW OF SYSTEMS:  CONSTITUTIONAL:  No Fever or chills  HEENT:  No diplopia or blurred vision.  No earache, sore throat or runny nose.  CARDIOVASCULAR:  No pressure, squeezing, strangling, tightness, heaviness or aching about the chest, neck, axilla or epigastrium.  RESPIRATORY:  No cough, shortness of breath  GASTROINTESTINAL:  No nausea, vomiting or diarrhea.  GENITOURINARY:  No dysuria, frequency or urgency  MUSCULOSKELETAL:  no joint aches, no muscle pain  SKIN:  No change in skin, hair or nails.  NEUROLOGIC:  No paresthesias, fasciculations  PSYCHIATRIC:  No disorder of thought or mood.  ENDOCRINE:  No heat or cold intolerance  HEMATOLOGICAL:  No easy bruising or bleeding.       Physical Exam:  Vital Signs Last 24 Hrs  T(C): 36.9 (29 Aug 2017 23:44), Max: 36.9 (29 Aug 2017 23:44)  T(F): 98.5 (29 Aug 2017 23:44), Max: 98.5 (29 Aug 2017 23:44)  HR: 70 (29 Aug 2017 23:44) (70 - 77)  BP: 106/53 (29 Aug 2017 23:44) (106/53 - 123/66)  RR: 18 (29 Aug 2017 23:44) (18 - 18)  SpO2: 94% (29 Aug 2017 23:44) (94% - 96%)      GEN: NAD, pleasant  HEENT: normocephalic and atraumatic. EOMI. PERRL.  +hearing loss, chronic  NECK: Supple  LUNGS: Clear to auscultation.  HEART: Regular rate and rhythm   ABDOMEN: Soft, nontender, and nondistended.  Positive bowel sounds.    : No CVA tenderness  EXTREMITIES: LLE with healed ulcer, no erythema. B/L Tyrel toes.  MSK: No joint swelling  NEUROLOGIC: no focal deficits  PSYCHIATRIC: Appropriate affect .  SKIN: No rash      Labs:  08-29    136  |  99  |  11.0  ----------------------------<  92  4.3   |  24.0  |  0.32<L>    Ca    7.9<L>      29 Aug 2017 08:06  Mg     1.7     08-29    TPro  6.4<L>  /  Alb  3.0<L>  /  TBili  0.5  /  DBili  x   /  AST  37<H>  /  ALT  39<H>  /  AlkPhos  99  08-28                        10.3   6.9   )-----------( 168      ( 29 Aug 2017 08:06 )             32.5       LIVER FUNCTIONS - ( 28 Aug 2017 18:17 )  Alb: 3.0 g/dL / Pro: 6.4 g/dL / ALK PHOS: 99 U/L / ALT: 39 U/L / AST: 37 U/L / GGT: x             RECENT CULTURES:  Culture - Blood (08.24.17 @ 16:22)    Gram Stain:   Growth in aerobic and anaerobic bottles: Gram Positive Cocci in Pairs and  Chains    Specimen Source: .Blood None    Culture Results:   Growth in aerobic and anaerobic bottles: Streptococcus agalactiae (Group  B)  See previous culture [id number]      Culture - Blood (08.24.17 @ 16:03)    -  Levofloxacin: S [id number]    -  Cefazolin: S    -  Vancomycin: S [id number]    -  Penicillin: S    -  Streptococcus agalactiae (Group B): Detec    Gram Stain:   Growth in aerobic and anaerobic bottles: Gram Positive Cocci in Pairs and  Chains    -  Clindamycin: S    Specimen Source: .Blood None    Organism: Blood Culture PCR    Organism: Streptococcus agalactiae (Group B)    Culture Results:   Growth in aerobic and anaerobic bottles: Streptococcus agalactiae (Group  B)  ***Blood Panel PCR results on this specimen are available  approximately 3 hours after the Gram stain result.***  Gram stain, PCR, and/or culture results may not always  correspond due to difference in methodologies.    Organism Identification: Blood Culture PCR  Streptococcus agalactiae (Group B)    Method Type: KB    Method Type: PCR      TTE   Summary:   1. Left ventricular ejection fraction, by visual estimation, is 55 to   60%.   2. Normal global left ventricular systolic function.   3. Spectral Doppler shows impaired relaxation pattern of left   ventricular myocardial filling (Grade I diastolic dysfunction).   4. Mild mitral annular calcification.   5. Mild aortic regurgitation.   6. Sclerotic aortic valve with normal opening.   7. Moderately enlarged left atrium.
[facility]  INFECTIOUS DISEASES AND INTERNAL MEDICINE OF [place]  =======================================================  Krunal Delacruz MD  Diplomates American Board of Internal Medicine and Infectious Diseases  =======================================================      MAYRA BROWN [id number]    Follow up: Bacteremia    No fevers or chills  No complaints    Allergies:  Bactrim (Unknown)  Neosporin (Rash)      Antibiotics:   penicillin   G  potassium  IVPB 4 Million Unit(s) IV Intermittent every 4 hours       REVIEW OF SYSTEMS:  CONSTITUTIONAL:  No Fever or chills  HEENT:  No diplopia or blurred vision.  No earache, sore throat or runny nose.  CARDIOVASCULAR:  No pressure, squeezing, strangling, tightness, heaviness or aching about the chest, neck, axilla or epigastrium.  RESPIRATORY:  No cough, shortness of breath  GASTROINTESTINAL:  No nausea, vomiting or diarrhea.  GENITOURINARY:  No dysuria, frequency or urgency  MUSCULOSKELETAL:  no joint aches, no muscle pain  SKIN:  No change in skin, hair or nails.  NEUROLOGIC:  No paresthesias, fasciculations  PSYCHIATRIC:  No disorder of thought or mood.  ENDOCRINE:  No heat or cold intolerance  HEMATOLOGICAL:  No easy bruising or bleeding.       Physical Exam:  Vital Signs Last 24 Hrs  T(C): 36.9 (29 Aug 2017 23:44), Max: 36.9 (29 Aug 2017 23:44)  T(F): 98.5 (29 Aug 2017 23:44), Max: 98.5 (29 Aug 2017 23:44)  HR: 70 (29 Aug 2017 23:44) (70 - 77)  BP: 106/53 (29 Aug 2017 23:44) (106/53 - 123/66)  RR: 18 (29 Aug 2017 23:44) (18 - 18)  SpO2: 94% (29 Aug 2017 23:44) (94% - 96%)      GEN: NAD, pleasant  HEENT: normocephalic and atraumatic. EOMI. PERRL.  +hearing loss, chronic  NECK: Supple  LUNGS: Clear to auscultation.  HEART: Regular rate and rhythm   ABDOMEN: Soft, nontender, and nondistended.  Positive bowel sounds.    : No CVA tenderness  EXTREMITIES: LLE with healed ulcer, no erythema. B/L [place] toes.  MSK: No joint swelling  NEUROLOGIC: no focal deficits  PSYCHIATRIC: Appropriate affect .  SKIN: No rash      Labs:  RECENT CULTURES:  Culture - Blood (08.28.17 @ 18:19)    Specimen Source: .Blood Blood    Culture Results:   No growth at 48 hours      Culture - Blood (08.28.17 @ 18:18)    Specimen Source: .Blood Blood    Culture Results:   No growth at 48 hours      Culture - Blood (08.24.17 @ 16:22)    Gram Stain:   Growth in aerobic and anaerobic bottles: Gram Positive Cocci in Pairs and  Chains    Specimen Source: .Blood None    Culture Results:   Growth in aerobic and anaerobic bottles: Streptococcus agalactiae (Group  B)  See previous culture [id number]      Culture - Blood (08.24.17 @ 16:03)    -  Levofloxacin: S [id number]    -  Cefazolin: S    -  Vancomycin: S [id number]    -  Penicillin: S    -  Streptococcus agalactiae (Group B): Detec    Gram Stain:   Growth in aerobic and anaerobic bottles: Gram Positive Cocci in Pairs and  Chains    -  Clindamycin: S    Specimen Source: .Blood None    Organism: Blood Culture PCR    Organism: Streptococcus agalactiae (Group B)    Culture Results:   Growth in aerobic and anaerobic bottles: Streptococcus agalactiae (Group  B)  ***Blood Panel PCR results on this specimen are available  approximately 3 hours after the Gram stain result.***  Gram stain, PCR, and/or culture results may not always  correspond due to difference in methodologies.    Organism Identification: Blood Culture PCR  Streptococcus agalactiae (Group B)    Method Type: KB    Method Type: PCR      TTE   Summary:   1. Left ventricular ejection fraction, by visual estimation, is 55 to   60%.   2. Normal global left ventricular systolic function.   3. Spectral Doppler shows impaired relaxation pattern of left   ventricular myocardial filling (Grade I diastolic dysfunction).   4. Mild mitral annular calcification.   5. Mild aortic regurgitation.   6. Sclerotic aortic valve with normal opening.   7. Moderately enlarged left atrium.
[facility]  INFECTIOUS DISEASES AND INTERNAL MEDICINE OF [place]  =======================================================  Krunal Delacruz MD  Diplomates American Board of Internal Medicine and Infectious Diseases  =======================================================      MAYRA BROWN [id number]    Follow up: Bacteremia    No fevers or chills  No complaints    Allergies:  Bactrim (Unknown)  Neosporin (Rash)      Antibiotics:   penicillin   G  potassium  IVPB 4 Million Unit(s) IV Intermittent every 4 hours       REVIEW OF SYSTEMS:  CONSTITUTIONAL:  No Fever or chills  HEENT:  No diplopia or blurred vision.  No earache, sore throat or runny nose.  CARDIOVASCULAR:  No pressure, squeezing, strangling, tightness, heaviness or aching about the chest, neck, axilla or epigastrium.  RESPIRATORY:  No cough, shortness of breath  GASTROINTESTINAL:  No nausea, vomiting or diarrhea.  GENITOURINARY:  No dysuria, frequency or urgency  MUSCULOSKELETAL:  no joint aches, no muscle pain  SKIN:  No change in skin, hair or nails.  NEUROLOGIC:  No paresthesias, fasciculations  PSYCHIATRIC:  No disorder of thought or mood.  ENDOCRINE:  No heat or cold intolerance  HEMATOLOGICAL:  No easy bruising or bleeding.       Physical Exam:  Vital Signs Last 24 Hrs  T(C): 36.5 (01 Sep 2017 08:33), Max: 37.1 (31 Aug 2017 15:48)  T(F): 97.7 (01 Sep 2017 08:33), Max: 98.7 (31 Aug 2017 15:48)  HR: 64 (01 Sep 2017 08:33) (60 - 81)  BP: 111/50 (01 Sep 2017 08:33) (101/53 - 127/59)  RR: 20 (01 Sep 2017 08:33) (18 - 20)  SpO2: 95% (01 Sep 2017 05:28) (94% - 97%)      GEN: NAD, pleasant  HEENT: normocephalic and atraumatic. EOMI. PERRL.  +hearing loss, chronic  NECK: Supple  LUNGS: Clear to auscultation.  HEART: Regular rate and rhythm   ABDOMEN: Soft, nontender, and nondistended.  Positive bowel sounds.    : No CVA tenderness  EXTREMITIES: LLE with healed ulcer, no erythema. B/L [place] toes.  MSK: No joint swelling  NEUROLOGIC: no focal deficits  PSYCHIATRIC: Appropriate affect .  SKIN: No rash      Labs:  RECENT CULTURES:  Culture - Blood (08.28.17 @ 18:19)    Specimen Source: .Blood Blood    Culture Results:   No growth at 48 hours      Culture - Blood (08.28.17 @ 18:18)    Specimen Source: .Blood Blood    Culture Results:   No growth at 48 hours      Culture - Blood (08.24.17 @ 16:22)    Gram Stain:   Growth in aerobic and anaerobic bottles: Gram Positive Cocci in Pairs and  Chains    Specimen Source: .Blood None    Culture Results:   Growth in aerobic and anaerobic bottles: Streptococcus agalactiae (Group  B)  See previous culture [id number]      Culture - Blood (08.24.17 @ 16:03)    -  Levofloxacin: S [id number]    -  Cefazolin: S    -  Vancomycin: S [id number]    -  Penicillin: S    -  Streptococcus agalactiae (Group B): Detec    Gram Stain:   Growth in aerobic and anaerobic bottles: Gram Positive Cocci in Pairs and  Chains    -  Clindamycin: S    Specimen Source: .Blood None    Organism: Blood Culture PCR    Organism: Streptococcus agalactiae (Group B)    Culture Results:   Growth in aerobic and anaerobic bottles: Streptococcus agalactiae (Group  B)  ***Blood Panel PCR results on this specimen are available  approximately 3 hours after the Gram stain result.***  Gram stain, PCR, and/or culture results may not always  correspond due to difference in methodologies.    Organism Identification: Blood Culture PCR  Streptococcus agalactiae (Group B)    Method Type: KB    Method Type: PCR      TTE   Summary:   1. Left ventricular ejection fraction, by visual estimation, is 55 to   60%.   2. Normal global left ventricular systolic function.   3. Spectral Doppler shows impaired relaxation pattern of left   ventricular myocardial filling (Grade I diastolic dysfunction).   4. Mild mitral annular calcification.   5. Mild aortic regurgitation.   6. Sclerotic aortic valve with normal opening.   7. Moderately enlarged left atrium.
MAYRA BROWN Female 89y [id number]    Patient is a 89y old  Female who presents with a chief complaint of pt was called to come to the hospital due to positive blood cultures (29 Aug 2017 02:26)      Subjective/objective:  Pt seen and examined at bedside, no over night event reported by night staff. Pt has no new complaints apart from pain.    Review of system:  No fever, chills, nausea, vomiting, headache, dizziness, chest pain, SOB or palpitation.      PHYSICAL EXAM:    Vital Signs Last 24 Hrs  T(C): 36.7 (29 Aug 2017 06:43), Max: 36.9 (28 Aug 2017 12:46)  T(F): 98.1 (29 Aug 2017 06:43), Max: 98.5 (28 Aug 2017 12:46)  HR: 65 (29 Aug 2017 06:43) (65 - 78)  BP: 119/59 (29 Aug 2017 06:43) (102/54 - 133/84)  BP(mean): --  RR: 18 (29 Aug 2017 06:43) (16 - 20)  SpO2: 95% (29 Aug 2017 06:43) (95% - 99%)    GENERAL: Pt lying comfortably, NAD, hard of hearing.  CHEST/LUNG: Clear to auscultation bilaterally; No wheezing.  HEART: S1S2+, Regular rate and rhythm; No murmurs.  ABDOMEN: Soft, Nontender, Nondistended; Bowel sounds present.  MUSCULOSKELETAL: Range of motion limited.  SKIN: RLE redness, per HHA much better now. Very fragile skin with bruises.  NEURO: AAOX3, no focal deficits, no motor r sensory loss.  PSYCH: no        MEDICATIONS  (STANDING):  atorvastatin 10 milliGRAM(s) Oral at bedtime  aspirin  chewable 81 milliGRAM(s) Oral daily  polyethylene glycol 3350 17 Gram(s) Oral daily  senna 2 Tablet(s) Oral at bedtime  amLODIPine   Tablet 5 milliGRAM(s) Oral daily  docusate sodium 100 milliGRAM(s) Oral two times a day  enoxaparin Injectable 40 milliGRAM(s) SubCutaneous daily  omega-3-Acid Ethyl Esters 2 Gram(s) Oral two times a day  cholecalciferol 1000 Unit(s) Oral daily  pyridoxine 50 milliGRAM(s) Oral daily  vancomycin  IVPB 1000 milliGRAM(s) IV Intermittent every 24 hours  magnesium sulfate  IVPB 2 Gram(s) IV Intermittent once    MEDICATIONS  (PRN):  oxyCODONE    5 mG/acetaminophen 325 mG 1 Tablet(s) Oral every 4 hours PRN Moderate Pain (4 - 6)        Labs:  LABS:                        10.3   6.9   )-----------( 168      ( 29 Aug 2017 08:06 )             32.5     08-29    136  |  99  |  11.0  ----------------------------<  92  4.3   |  24.0  |  0.32<L>    Ca    7.9<L>      29 Aug 2017 08:06  Mg     1.7     08-29    TPro  6.4<L>  /  Alb  3.0<L>  /  TBili  0.5  /  DBili  x   /  AST  37<H>  /  ALT  39<H>  /  AlkPhos  99  08-28        LIVER FUNCTIONS - ( 28 Aug 2017 18:17 )  Alb: 3.0 g/dL / Pro: 6.4 g/dL / ALK PHOS: 99 U/L / ALT: 39 U/L / AST: 37 U/L / GGT: x

## 2017-09-01 NOTE — DISCHARGE NOTE ADULT - PATIENT PORTAL LINK FT
“You can access the [facility] Patient Portal, offered by [facility], by registering with the following website: [email or web address]”

## 2017-09-01 NOTE — PROGRESS NOTE ADULT - PROBLEM SELECTOR PLAN 1
Blood cultures with group B strep  Repeat blood cultures no growth 8/28/17  On IV PCN  possible source cellulitis  Does not want CHLOE, will do surveillance cultures after antibiotics  Will need weekly CBC and CMP faxed to [phone]  Appointment with us in 7 to 10 days - [phone]  End Date Sept. 12, 2017
Blood cultures with group B strep  Repeat blood cultures no growth 8/28/17  On IV PCN  possible source cellulitis  Does not want CHLOE, will do surveillance cultures after antibiotics  Will need weekly CBC and CMP faxed to [phone]  Appointment with us in 7 to 10 days - [phone]  End Date Sept. 12, 2017
Blood cultures with group B strep  continue Vancomycin IV for now  Repeat blood cultures pending  possible source cellulitis  Follow up repeat cultures
Blood cultures with group B strep  continue Vancomycin IV for now  Repeat blood cultures pending  possible source cellulitis  Follow up repeat cultures

## 2017-09-01 NOTE — DISCHARGE NOTE ADULT - MEDICATION SUMMARY - MEDICATIONS TO TAKE
I will START or STAY ON the medications listed below when I get home from the hospital:    Weekly labs  -- 1 intravenous once a week MDD:Weekly CBC, CMP  Fax to [phone]  -- Indication: For Other specified abnormal findings of blood chemistry    aspirin 162 mg oral delayed release tablet  -- 1 by mouth once a day  -- Indication: For Cardiac    atorvastatin 10 mg oral tablet  -- 1 tab(s) by mouth 3 times a week  -- Indication: For dyslipidemia    amlodipine-benazepril 5 mg-10 mg oral capsule  -- 1 cap(s) by mouth once a day  -- Indication: For hypertension     ammonium lactate 12% topical cream  -- Apply on skin to affected area 2 times a day  -- Indication: For Skin dryness     Glucosamine Chondroitin MSM Complex  -- 1 tab(s) by mouth once a day  -- Indication: For Pain of left hip joint    Omega-3 1000 mg oral capsule  -- 1 cap(s) by mouth once a day  -- Indication: For dyslipidemia    methenamine hippurate 1 g oral tablet  -- 1 tab(s) by mouth once a day  -- Indication: For uti    Vitamin B6 50 mg oral tablet  -- 1 tab(s) by mouth once a day  -- Indication: For Supplement

## 2017-09-01 NOTE — DISCHARGE NOTE ADULT - PLAN OF CARE
ADLs Follow up with hematology oncologist for further workup on CT finding in abdomen possible cancer  UP ad ebenezer  cardiac diet Primary care MD Ashok Laughlin to arrange hematologyOncology evaluation for patient. Appointment made for PMD office visit Tuesday 9/5/17 at 11.00AM.   Pmd to arrange Hemoco consult

## 2017-09-01 NOTE — PROGRESS NOTE ADULT - PROBLEM SELECTOR PROBLEM 1
Sepsis due to Streptococcus, group B

## 2017-09-01 NOTE — DISCHARGE NOTE ADULT - CARE PROVIDER_API CALL
Ashok Laughlin), Internal Medicine  [address] [place] [address]  Phone: [phone]  Fax: [phone]

## 2017-09-01 NOTE — DISCHARGE NOTE ADULT - CARE PLAN
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  Instructions for follow-up, activity and diet:	Appointment made for PMD office visit Tuesday 9/5/17 at 11.00AM.   Pmd to arrange Hemoco consult

## 2017-09-02 LAB
CULTURE RESULTS: SIGNIFICANT CHANGE UP
CULTURE RESULTS: SIGNIFICANT CHANGE UP
SPECIMEN SOURCE: SIGNIFICANT CHANGE UP
SPECIMEN SOURCE: SIGNIFICANT CHANGE UP

## 2017-09-03 LAB
CULTURE RESULTS: SIGNIFICANT CHANGE UP
SPECIMEN SOURCE: SIGNIFICANT CHANGE UP

## 2017-10-19 PROCEDURE — 97163 PT EVAL HIGH COMPLEX 45 MIN: CPT

## 2017-10-19 PROCEDURE — 80048 BASIC METABOLIC PNL TOTAL CA: CPT

## 2017-10-19 PROCEDURE — 99285 EMERGENCY DEPT VISIT HI MDM: CPT | Mod: 25

## 2017-10-19 PROCEDURE — 74177 CT ABD & PELVIS W/CONTRAST: CPT

## 2017-10-19 PROCEDURE — 87040 BLOOD CULTURE FOR BACTERIA: CPT

## 2017-10-19 PROCEDURE — 87086 URINE CULTURE/COLONY COUNT: CPT

## 2017-10-19 PROCEDURE — 85027 COMPLETE CBC AUTOMATED: CPT

## 2017-10-19 PROCEDURE — 83735 ASSAY OF MAGNESIUM: CPT

## 2017-10-19 PROCEDURE — 73502 X-RAY EXAM HIP UNI 2-3 VIEWS: CPT

## 2017-10-19 PROCEDURE — 36415 COLL VENOUS BLD VENIPUNCTURE: CPT

## 2017-10-19 PROCEDURE — 80053 COMPREHEN METABOLIC PANEL: CPT

## 2017-10-19 PROCEDURE — 80202 ASSAY OF VANCOMYCIN: CPT

## 2017-10-19 PROCEDURE — 83605 ASSAY OF LACTIC ACID: CPT

## 2017-10-19 PROCEDURE — 93306 TTE W/DOPPLER COMPLETE: CPT

## 2017-10-19 PROCEDURE — 72192 CT PELVIS W/O DYE: CPT

## 2017-10-19 PROCEDURE — 76377 3D RENDER W/INTRP POSTPROCES: CPT

## 2017-10-19 PROCEDURE — 96374 THER/PROPH/DIAG INJ IV PUSH: CPT

## 2017-11-13 ENCOUNTER — OUTPATIENT (OUTPATIENT)
Dept: OUTPATIENT SERVICES | Facility: HOSPITAL | Age: 82
LOS: 1 days | Discharge: HOME | End: 2017-11-13
Payer: MEDICARE

## 2017-11-13 ENCOUNTER — RESULT REVIEW (OUTPATIENT)
Age: 82
End: 2017-11-13

## 2017-11-13 VITALS
TEMPERATURE: 98 F | DIASTOLIC BLOOD PRESSURE: 51 MMHG | WEIGHT: 139.99 LBS | SYSTOLIC BLOOD PRESSURE: 110 MMHG | OXYGEN SATURATION: 100 % | HEART RATE: 75 BPM | HEIGHT: 65 IN | RESPIRATION RATE: 14 BRPM

## 2017-11-13 VITALS
RESPIRATION RATE: 17 BRPM | OXYGEN SATURATION: 96 % | TEMPERATURE: 98 F | DIASTOLIC BLOOD PRESSURE: 52 MMHG | SYSTOLIC BLOOD PRESSURE: 126 MMHG | HEART RATE: 64 BPM

## 2017-11-13 LAB
ANION GAP SERPL CALC-SCNC: 3 MMOL/L — LOW (ref 5–17)
BLD GP AB SCN SERPL QL: SIGNIFICANT CHANGE UP
BUN SERPL-MCNC: 14 MG/DL — SIGNIFICANT CHANGE UP (ref 7–23)
CALCIUM SERPL-MCNC: 8.2 MG/DL — LOW (ref 8.5–10.1)
CHLORIDE SERPL-SCNC: 106 MMOL/L — SIGNIFICANT CHANGE UP (ref 96–108)
CO2 SERPL-SCNC: 30 MMOL/L — SIGNIFICANT CHANGE UP (ref 22–31)
CREAT SERPL-MCNC: 0.46 MG/DL — LOW (ref 0.5–1.3)
GLUCOSE SERPL-MCNC: 86 MG/DL — SIGNIFICANT CHANGE UP (ref 70–99)
HCT VFR BLD CALC: 28.6 % — LOW (ref 34.5–45)
HGB BLD-MCNC: 9 G/DL — LOW (ref 11.5–15.5)
INR BLD: 1.33 RATIO — HIGH (ref 0.88–1.16)
MCHC RBC-ENTMCNC: 28.4 PG — SIGNIFICANT CHANGE UP (ref 27–34)
MCHC RBC-ENTMCNC: 31.6 GM/DL — LOW (ref 32–36)
MCV RBC AUTO: 89.7 FL — SIGNIFICANT CHANGE UP (ref 80–100)
PLATELET # BLD AUTO: 298 K/UL — SIGNIFICANT CHANGE UP (ref 150–400)
POTASSIUM SERPL-MCNC: 4.1 MMOL/L — SIGNIFICANT CHANGE UP (ref 3.5–5.3)
POTASSIUM SERPL-SCNC: 4.1 MMOL/L — SIGNIFICANT CHANGE UP (ref 3.5–5.3)
PROTHROM AB SERPL-ACNC: 14.4 SEC — HIGH (ref 9.8–12.7)
RBC # BLD: 3.19 M/UL — LOW (ref 3.8–5.2)
RBC # FLD: 16 % — HIGH (ref 10.3–14.5)
SODIUM SERPL-SCNC: 139 MMOL/L — SIGNIFICANT CHANGE UP (ref 135–145)
TYPE + AB SCN PNL BLD: SIGNIFICANT CHANGE UP
WBC # BLD: 5.8 K/UL — SIGNIFICANT CHANGE UP (ref 3.8–10.5)
WBC # FLD AUTO: 5.8 K/UL — SIGNIFICANT CHANGE UP (ref 3.8–10.5)

## 2017-11-13 PROCEDURE — 88173 CYTOPATH EVAL FNA REPORT: CPT | Mod: 26

## 2017-11-13 PROCEDURE — 49180 BIOPSY ABDOMINAL MASS: CPT

## 2017-11-13 PROCEDURE — 88305 TISSUE EXAM BY PATHOLOGIST: CPT | Mod: 26

## 2017-11-13 PROCEDURE — 77012 CT SCAN FOR NEEDLE BIOPSY: CPT | Mod: 26

## 2017-11-13 PROCEDURE — 88341 IMHCHEM/IMCYTCHM EA ADD ANTB: CPT | Mod: 26

## 2017-11-13 PROCEDURE — 88342 IMHCHEM/IMCYTCHM 1ST ANTB: CPT | Mod: 26

## 2017-11-13 PROCEDURE — 88172 CYTP DX EVAL FNA 1ST EA SITE: CPT | Mod: 26

## 2017-11-13 RX ORDER — SODIUM CHLORIDE 9 MG/ML
1000 INJECTION INTRAMUSCULAR; INTRAVENOUS; SUBCUTANEOUS
Qty: 0 | Refills: 0 | Status: DISCONTINUED | OUTPATIENT
Start: 2017-11-13 | End: 2017-11-13

## 2017-11-13 RX ORDER — ONDANSETRON 8 MG/1
4 TABLET, FILM COATED ORAL ONCE
Qty: 0 | Refills: 0 | Status: DISCONTINUED | OUTPATIENT
Start: 2017-11-13 | End: 2017-11-13

## 2017-11-13 RX ORDER — OXYCODONE AND ACETAMINOPHEN 5; 325 MG/1; MG/1
1 TABLET ORAL EVERY 4 HOURS
Qty: 0 | Refills: 0 | Status: DISCONTINUED | OUTPATIENT
Start: 2017-11-13 | End: 2017-11-13

## 2017-11-13 RX ORDER — FENTANYL CITRATE 50 UG/ML
25 INJECTION INTRAVENOUS
Qty: 0 | Refills: 0 | Status: DISCONTINUED | OUTPATIENT
Start: 2017-11-13 | End: 2017-11-13

## 2017-11-13 RX ORDER — GLUCOSAMINE/MSM/CHONDROITIN A 750-375MG
1 TABLET ORAL
Qty: 0 | Refills: 0 | COMMUNITY

## 2017-11-15 LAB — SURGICAL PATHOLOGY FINAL REPORT - CH: SIGNIFICANT CHANGE UP

## 2017-11-16 ENCOUNTER — RESULT REVIEW (OUTPATIENT)
Age: 82
End: 2017-11-16

## 2017-11-18 DIAGNOSIS — C79.89 SECONDARY MALIGNANT NEOPLASM OF OTHER SPECIFIED SITES: ICD-10-CM

## 2017-11-18 DIAGNOSIS — I25.10 ATHEROSCLEROTIC HEART DISEASE OF NATIVE CORONARY ARTERY WITHOUT ANGINA PECTORIS: ICD-10-CM

## 2017-11-18 DIAGNOSIS — I10 ESSENTIAL (PRIMARY) HYPERTENSION: ICD-10-CM

## 2017-11-18 DIAGNOSIS — R59.0 LOCALIZED ENLARGED LYMPH NODES: ICD-10-CM

## 2019-05-01 NOTE — ED ADULT NURSE NOTE - PRIMARY CARE PROVIDER
Pt cancelled through [facility], called to see if would like to r/s no answer and no voice mail.  [phone] na

## 2019-06-27 NOTE — PROCEDURE NOTE - NSINFORMCONSENT_GEN_A_CORE
Benefits, risks, and possible complications of procedure explained to patient/caregiver who verbalized understanding and gave verbal consent. Detail Level: Generalized Detail Level: Detailed

## 2022-11-23 NOTE — ED ADULT TRIAGE NOTE - MODE OF ARRIVAL
-- DO NOT REPLY / DO NOT REPLY ALL --  -- Message is from Engagement Center Operations (ECO) --    ONLY TO BE USED WITHIN A REFILL MEDICATION ENCOUNTER    Med Refill  Is the patient currently having any symptoms?: No/Non-Emergent symptoms    Name of medication requested: See pended med    Has patient contacted the pharmacy? Yes    Is this the first request for the medication in the last 48 hours?: Yes      Patient is requesting a medication refill - medication is on active list- She shows she only has 2 weeks left of it- will not make to her       Full name of the provider who ordered the medication: Jada Zaidi MD    Clinic site name / Account # for provider: [facility]    Preferred Pharmacy: Pharmacy  [facility] [address] [place] [address]    Patient confirmed the above pharmacy as correct?  Yes          Alternative phone number:     Can a detailed message be left?: Yes    Patient is completely out of medication: Verify if patient is currently experiencing symptoms. If patient is symptomatic, proceed with front end triage instead of medication refill. If patient is not symptomatic but is completely out of medication, adán as High priority when routing. Inform patient: “Please call back with any questions or concerns and if your condition becomes life threatening, you should seek immediate medical assistance by calling 911 or going to the Emergency Department for evaluation.”    Inform all patients: \"If the clinical team needs to contact you regarding this refill, please be aware the return phone call may come from an unidentified or out of state phone number and your refill request will be addressed as soon as the clinical team reviews your message.\"   Walk in

## 2022-11-23 NOTE — ASU PATIENT PROFILE, ADULT - FALL HARM RISK
From: Nevaeh Barton  To: Erika Miller  Sent: 11/23/2022 6:59 AM CST  Subject: Hip pain    I had my hips injected yesterday. I woke at 0530 with severe left back and going into my left groin. It started as I tried to sit on the toilet. I have been icing but am still in severe pain . Wondering what can be done?   age(85 years old or older)/bones(Osteoporosis,prev fx,steroid use,metastatic bone ca

## 2023-02-15 NOTE — ED ADULT NURSE NOTE - AS SC BRADEN FRICTION
What Type Of Note Output Would You Prefer (Optional)?: Standard Output Hpi Title: Evaluation of Skin Lesions Year Removed: 1900 (2) potential problem

## 2024-08-20 NOTE — PHYSICAL THERAPY INITIAL EVALUATION ADULT - PERTINENT HX OF CURRENT PROBLEM, REHAB EVAL
Left message  
89 y/o female w/ hx of melanoma, PVD, HTN, HLD, seen in ED with HHA at bedside. History is very limited, obtained from patient HHA and medical records. She was recently seen in [facility] on 8/24 and found to have RLE cellulitis, blood cx were drawn and patient was discharged from ED on oral clindamycin. Pt was called back from [facility] for positive blood culture and she was told to come back to ED